# Patient Record
Sex: FEMALE | Race: WHITE
[De-identification: names, ages, dates, MRNs, and addresses within clinical notes are randomized per-mention and may not be internally consistent; named-entity substitution may affect disease eponyms.]

---

## 2020-04-30 ENCOUNTER — HOSPITAL ENCOUNTER (EMERGENCY)
Dept: HOSPITAL 47 - EC | Age: 50
Discharge: HOME | End: 2020-04-30
Payer: COMMERCIAL

## 2020-04-30 VITALS — HEART RATE: 82 BPM | DIASTOLIC BLOOD PRESSURE: 79 MMHG | SYSTOLIC BLOOD PRESSURE: 141 MMHG

## 2020-04-30 VITALS — TEMPERATURE: 98.5 F | RESPIRATION RATE: 18 BRPM

## 2020-04-30 DIAGNOSIS — Z79.899: ICD-10-CM

## 2020-04-30 DIAGNOSIS — F41.9: ICD-10-CM

## 2020-04-30 DIAGNOSIS — F17.200: ICD-10-CM
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
## 2020-04-30 DIAGNOSIS — S52.124A: Primary | ICD-10-CM

## 2020-04-30 DIAGNOSIS — F32.9: ICD-10-CM
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
## 2020-04-30 PROCEDURE — 99283 EMERGENCY DEPT VISIT LOW MDM: CPT

## 2020-04-30 PROCEDURE — 29105 APPLICATION LONG ARM SPLINT: CPT

## 2020-04-30 NOTE — ED
General Adult HPI





- General


Chief complaint: Extremity Injury, Upper


Stated complaint: Fall


Time Seen by Provider: 04/30/20 07:16


Source: patient, RN notes reviewed


Mode of arrival: ambulatory


Limitations: no limitations





- History of Present Illness


Initial comments: 





49-year-old female presents for mechanical fall yesterday.  Patient states she 

has been starting to go for frequent walks and tripped over the sidewalk where 

there was an uneven area.  Patient injured her elbow and has had pain radiating 

to her right wrist since that time.  She did not hit her head.  She is not on 

any blood thinners.Patient has no other complaints at this time including 

shortness of breath, chest pain, abdominal pain, nausea or vomiting, headache, 

or visual changes.





- Related Data


                                Home Medications











 Medication  Instructions  Recorded  Confirmed


 


clonazePAM [KlonoPIN] 1 mg PO BID 03/01/16 06/10/16


 


Ziprasidone HCl 160 mg PO DAILY 04/09/16 06/10/16


 


Vortioxetine Hydrobromide 20 mg PO 06/10/16 06/10/16





[Brintellix]   











                                    Allergies











Allergy/AdvReac Type Severity Reaction Status Date / Time


 


No Known Allergies Allergy   Verified 04/30/20 07:14














Review of Systems


ROS Statement: 


Those systems with pertinent positive or pertinent negative responses have been 

documented in the HPI.





ROS Other: All systems not noted in ROS Statement are negative.





Past Medical History


Past Medical History: No Reported History


Additional Past Medical History / Comment(s): anxiety  migraines


History of Any Multi-Drug Resistant Organisms: None Reported


Past Surgical History: No Surgical Hx Reported


Past Psychological History: Anxiety, Depression


Smoking Status: Current every day smoker


Past Alcohol Use History: Rare


Past Drug Use History: Marijuana





General Exam


Limitations: no limitations


General appearance: alert, in no apparent distress


Head exam: Present: atraumatic, normocephalic, normal inspection


Eye exam: Present: normal appearance, PERRL, EOMI.  Absent: scleral icterus, 

conjunctival injection, periorbital swelling


ENT exam: Present: normal exam, mucous membranes moist


Neck exam: Present: normal inspection.  Absent: tenderness, meningismus, 

lymphadenopathy


Respiratory exam: Present: normal lung sounds bilaterally


Extremities exam: Present: tenderness (Tenderness along the radial head as well 

as the olecranon.  No tenderness of the scaphoid or wrist or hand.), normal 

capillary refill (Capillary refill less than 2 seconds, radial pulse 2+.), other

 (Sensation intact in the right upper extremity.   strength 5 out of 5.).  

Absent: full ROM (Patient able to flex right elbow to 90 has pain with 

additional flexion or extension.  Full range of motion of the right hand didn't 

wrist.), pedal edema, joint swelling, calf tenderness





Course





                                   Vital Signs











  04/30/20





  07:12


 


Temperature 98.5 F


 


Pulse Rate 88


 


Respiratory 18





Rate 


 


Blood Pressure 148/62


 


O2 Sat by Pulse 98





Oximetry 














Procedures





- Orthopedic Splinting/Casting


  ** Injury #1


Side: right


Upper Extremity Injury Location: elbow


Upper Extremity Immobilizer: posterior splint


Additional Comments: 





Neurovascular status intact after splint applied





Medical Decision Making





- Medical Decision Making





X-ray of the elbow wrist and forearm were obtained which showed acute 

nondisplaced intra-articular transverse fracture of the radial head.  Additional

 acute avulsion type fracture of the proximal ulna with intra-articular 5 x 3 mm

 loose body noted.  She was placed in a long-arm splint and given a sling.  

Recommended range of motion exercises with the shoulder.  Recommend she follow 

up with orthopedics in the next day or 2.  She will return here if she has any 

worsening symptoms.





Disposition


Clinical Impression: 


 Elbow fracture, right





Disposition: HOME SELF-CARE


Condition: Good


Instructions (If sedation given, give patient instructions):  Elbow Fracture 

(ED)


Additional Instructions: 


Please take Tylenol for pain.  Please follow-up with orthopedics in one to 2 

days.  Wear sling and splint as directed.  Continue to do range of motion 

exercises of the shoulder while wearing the sling.  Return to the emergency 

department for any worsening symptoms.


Is patient prescribed a controlled substance at d/c from ED?: No


Referrals: 


Homer Pond DO [Primary Care Provider] - 1-2 days


Time of Disposition: 08:13

## 2020-04-30 NOTE — XR
EXAMINATION TYPE: XR elbow complete RT, XR wrist complete RT, XR forearm RT

 

DATE OF EXAM: 4/30/2020

 

CLINICAL HISTORY: Fall injury with pain.

 

TECHNIQUE:  Frontal, lateral and oblique images of the right wrist and elbow are obtained. Additional
 fourth scaphoid view right wrist. 2 views right forearm.

 

COMPARISON: None

 

FINDINGS:  There are abnormal fat pad signs. Acute nondisplaced transverse fracture through the radia
l head is present. There is also acute avulsion type fracture from the ulnar aspect of the proximal u
lna with 5 x 3 mm intra-articular loose body seen best on lateral view. The overlying soft tissue smita
ears unremarkable.

 

2 views of right forearm show no additional acute fracture or dislocation. Overlying soft tissue is u
nremarkable.

 

No additional acute fracture or dislocation is seen in the right wrist. Carpal joint spaces are maint
ained. Overlying soft tissue is unremarkable.

 

IMPRESSION:  There is acute nondisplaced intra-articular transverse fracture of the radial head. Elan
tional acute avulsion type fracture from the proximal ulna with intra-articular 5 x 3 mm loose body n
oted.

 

(Initial encounter closed type posttraumatic fracture)

## 2022-03-14 ENCOUNTER — HOSPITAL ENCOUNTER (OUTPATIENT)
Dept: HOSPITAL 47 - RADMAMWWP | Age: 52
Discharge: HOME | End: 2022-03-14
Attending: STUDENT IN AN ORGANIZED HEALTH CARE EDUCATION/TRAINING PROGRAM
Payer: COMMERCIAL

## 2022-03-14 DIAGNOSIS — Z12.31: Primary | ICD-10-CM

## 2022-03-14 PROCEDURE — 77063 BREAST TOMOSYNTHESIS BI: CPT

## 2022-03-14 PROCEDURE — 77067 SCR MAMMO BI INCL CAD: CPT

## 2022-03-15 NOTE — MM
Reason for exam: screening  (asymptomatic).

Last mammogram was performed 10 years and 5 months ago.



History:

Took hormonal contraceptives for 18 years beginning at age 17.



Physical Findings:

A clinical breast exam by your physician is recommended on an annual basis and 

results should be correlated with mammographic findings.



MG 3D Screening Mammo W/Cad

Bilateral CC and MLO view(s) were taken.

Prior study comparison: November 8, 2016, mammogram, performed at [facility].  August 17, 2015, mammogram, performed at [facility].  August 8, 2014, mammogram, performed at [facility].  

October 19, 2011, bilateral digital screening mammo w/CAD.  August 25, 2009, 

bilateral diagnostic digital mammog.

There are scattered fibroglandular densities.  There are benign appearing round 

calcifications bilaterally. There is no discrete abnormality.





ASSESSMENT: Benign, BI-RAD 2



RECOMMENDATION:

Routine screening mammogram of both breasts in 1 year.

## 2022-10-20 ENCOUNTER — HOSPITAL ENCOUNTER (INPATIENT)
Dept: HOSPITAL 47 - EC | Age: 52
LOS: 11 days | Discharge: HOME HEALTH | DRG: 329 | End: 2022-10-31
Attending: SURGERY | Admitting: SURGERY
Payer: COMMERCIAL

## 2022-10-20 VITALS — BODY MASS INDEX: 31.6 KG/M2

## 2022-10-20 DIAGNOSIS — C18.0: Primary | ICD-10-CM

## 2022-10-20 DIAGNOSIS — K35.32: ICD-10-CM

## 2022-10-20 DIAGNOSIS — Z88.8: ICD-10-CM

## 2022-10-20 DIAGNOSIS — E66.9: ICD-10-CM

## 2022-10-20 DIAGNOSIS — Z71.3: ICD-10-CM

## 2022-10-20 DIAGNOSIS — K56.7: ICD-10-CM

## 2022-10-20 DIAGNOSIS — K42.9: ICD-10-CM

## 2022-10-20 DIAGNOSIS — F17.200: ICD-10-CM

## 2022-10-20 DIAGNOSIS — Z71.6: ICD-10-CM

## 2022-10-20 DIAGNOSIS — F41.9: ICD-10-CM

## 2022-10-20 DIAGNOSIS — Z53.31: ICD-10-CM

## 2022-10-20 LAB
ALBUMIN SERPL-MCNC: 4.2 G/DL (ref 3.5–5)
ALP SERPL-CCNC: 94 U/L (ref 38–126)
ALT SERPL-CCNC: 16 U/L (ref 4–34)
ANION GAP SERPL CALC-SCNC: 13 MMOL/L
AST SERPL-CCNC: 17 U/L (ref 14–36)
BASOPHILS # BLD AUTO: 0 K/UL (ref 0–0.2)
BASOPHILS NFR BLD AUTO: 0 %
BUN SERPL-SCNC: 12 MG/DL (ref 7–17)
CALCIUM SPEC-MCNC: 9 MG/DL (ref 8.4–10.2)
CHLORIDE SERPL-SCNC: 101 MMOL/L (ref 98–107)
CO2 SERPL-SCNC: 24 MMOL/L (ref 22–30)
EOSINOPHIL # BLD AUTO: 0.1 K/UL (ref 0–0.7)
EOSINOPHIL NFR BLD AUTO: 1 %
ERYTHROCYTE [DISTWIDTH] IN BLOOD BY AUTOMATED COUNT: 4.42 M/UL (ref 3.8–5.4)
ERYTHROCYTE [DISTWIDTH] IN BLOOD: 13.8 % (ref 11.5–15.5)
GLUCOSE SERPL-MCNC: 93 MG/DL (ref 74–99)
HCT VFR BLD AUTO: 39.1 % (ref 34–46)
HGB BLD-MCNC: 13.5 GM/DL (ref 11.4–16)
LIPASE SERPL-CCNC: 34 U/L (ref 23–300)
LYMPHOCYTES # SPEC AUTO: 2.1 K/UL (ref 1–4.8)
LYMPHOCYTES NFR SPEC AUTO: 18 %
MCH RBC QN AUTO: 30.4 PG (ref 25–35)
MCHC RBC AUTO-ENTMCNC: 34.4 G/DL (ref 31–37)
MCV RBC AUTO: 88.4 FL (ref 80–100)
MONOCYTES # BLD AUTO: 0.7 K/UL (ref 0–1)
MONOCYTES NFR BLD AUTO: 6 %
NEUTROPHILS # BLD AUTO: 8.3 K/UL (ref 1.3–7.7)
NEUTROPHILS NFR BLD AUTO: 73 %
PH UR: 6 [PH] (ref 5–8)
PLATELET # BLD AUTO: 235 K/UL (ref 150–450)
POTASSIUM SERPL-SCNC: 4.4 MMOL/L (ref 3.5–5.1)
PROT SERPL-MCNC: 6.9 G/DL (ref 6.3–8.2)
SODIUM SERPL-SCNC: 138 MMOL/L (ref 137–145)
SP GR UR: 1.01 (ref 1–1.03)
UROBILINOGEN UR QL STRIP: <2 MG/DL (ref ?–2)
WBC # BLD AUTO: 11.4 K/UL (ref 3.8–10.6)

## 2022-10-20 PROCEDURE — 0WJG4ZZ INSPECTION OF PERITONEAL CAVITY, PERCUTANEOUS ENDOSCOPIC APPROACH: ICD-10-PCS

## 2022-10-20 PROCEDURE — 80053 COMPREHEN METABOLIC PANEL: CPT

## 2022-10-20 PROCEDURE — 83690 ASSAY OF LIPASE: CPT

## 2022-10-20 PROCEDURE — 96376 TX/PRO/DX INJ SAME DRUG ADON: CPT

## 2022-10-20 PROCEDURE — 0DTH0ZZ RESECTION OF CECUM, OPEN APPROACH: ICD-10-PCS

## 2022-10-20 PROCEDURE — 74177 CT ABD & PELVIS W/CONTRAST: CPT

## 2022-10-20 PROCEDURE — 81025 URINE PREGNANCY TEST: CPT

## 2022-10-20 PROCEDURE — 88309 TISSUE EXAM BY PATHOLOGIST: CPT

## 2022-10-20 PROCEDURE — 83735 ASSAY OF MAGNESIUM: CPT

## 2022-10-20 PROCEDURE — 36415 COLL VENOUS BLD VENIPUNCTURE: CPT

## 2022-10-20 PROCEDURE — 0WQF0ZZ REPAIR ABDOMINAL WALL, OPEN APPROACH: ICD-10-PCS

## 2022-10-20 PROCEDURE — 85025 COMPLETE CBC W/AUTO DIFF WBC: CPT

## 2022-10-20 PROCEDURE — 96375 TX/PRO/DX INJ NEW DRUG ADDON: CPT

## 2022-10-20 PROCEDURE — 87040 BLOOD CULTURE FOR BACTERIA: CPT

## 2022-10-20 PROCEDURE — 82570 ASSAY OF URINE CREATININE: CPT

## 2022-10-20 PROCEDURE — 87324 CLOSTRIDIUM AG IA: CPT

## 2022-10-20 PROCEDURE — 96374 THER/PROPH/DIAG INJ IV PUSH: CPT

## 2022-10-20 PROCEDURE — 99285 EMERGENCY DEPT VISIT HI MDM: CPT

## 2022-10-20 PROCEDURE — 83605 ASSAY OF LACTIC ACID: CPT

## 2022-10-20 PROCEDURE — 81003 URINALYSIS AUTO W/O SCOPE: CPT

## 2022-10-20 PROCEDURE — 0DTJ0ZZ RESECTION OF APPENDIX, OPEN APPROACH: ICD-10-PCS

## 2022-10-20 PROCEDURE — 64999 UNLISTED PX NERVOUS SYSTEM: CPT

## 2022-10-20 PROCEDURE — 96361 HYDRATE IV INFUSION ADD-ON: CPT

## 2022-10-20 PROCEDURE — 80048 BASIC METABOLIC PNL TOTAL CA: CPT

## 2022-10-20 RX ADMIN — ACETAMINOPHEN SCH MLS/HR: 10 INJECTION, SOLUTION INTRAVENOUS at 21:26

## 2022-10-20 RX ADMIN — CEFAZOLIN SCH: 330 INJECTION, POWDER, FOR SOLUTION INTRAMUSCULAR; INTRAVENOUS at 21:19

## 2022-10-20 RX ADMIN — DEXTROSE MONOHYDRATE, SODIUM CHLORIDE, AND POTASSIUM CHLORIDE SCH MLS/HR: 50; 4.5; 1.49 INJECTION, SOLUTION INTRAVENOUS at 21:26

## 2022-10-20 RX ADMIN — PIPERACILLIN AND TAZOBACTAM SCH MLS/HR: 3; .375 INJECTION, POWDER, FOR SOLUTION INTRAVENOUS at 23:23

## 2022-10-20 RX ADMIN — HYDROMORPHONE HYDROCHLORIDE PRN MG: 1 INJECTION, SOLUTION INTRAMUSCULAR; INTRAVENOUS; SUBCUTANEOUS at 13:01

## 2022-10-20 RX ADMIN — CEFAZOLIN SCH MLS/HR: 330 INJECTION, POWDER, FOR SOLUTION INTRAMUSCULAR; INTRAVENOUS at 12:56

## 2022-10-20 RX ADMIN — METRONIDAZOLE SCH MLS/HR: 500 INJECTION, SOLUTION INTRAVENOUS at 21:26

## 2022-10-20 RX ADMIN — HEPARIN SODIUM SCH UNIT: 5000 INJECTION INTRAVENOUS; SUBCUTANEOUS at 23:23

## 2022-10-20 NOTE — P.OP
Date of Procedure: 10/20/22


Procedure(s) Performed: 


PREOPERATIVE DIAGNOSIS: Acute appendicitis


POSTOPERATIVE DIAGNOSIS: Ruptured appendicitis with possible cecal tumor


PROCEDURE: Diagnostic laparoscopy converted to laparotomy with ileocecectomy and

open repair umbilical hernia


SURGEON: Paco


EBL: 50 mL


ANESTHESIA: General


COMPLICATIONS: None


OPERATIVE PROCEDURE: The patient was brought and placed on the operating table 

in the supine position.  The patient was placed under general anesthesia.  The 

abdomen was prepped and draped in the usual sterile fashion.  A small vertical 

infraumbilical incision was made.  The fascia was retracted anteriorly with 

Therese forceps.  The Veress needle was advanced into the peritoneal cavity.  The 

saline drop test was normal.  Insufflation took place to 15 mmHg.  A 5 mm trocar

was then placed.  An additional 5 mm suprapubic trocar was placed under direct 

visualization as well as a 12 mm left lower quadrant trocar under direct 

visualization.  The appendix was inspected.  It was distended and inflamed.  As 

I initially tried to grab the appendix and remove it inferiorly to inspect the 

base of the cecum a small defect in the thin wall of the appendix was 

identified.  A clearish purulence fluid was noted to drain from that area.  This

was aspirated immediately.  The base of the appendix at the cecum was difficult 

to visualize as the appendix was adherent to the cecum.  Palpation however of 

the base of the cecum suggested the presence of significant induration or mass. 

I could tell very quickly that we would not be able to dissect down to the base 

of the appendix and complete a resection of that area laparoscopically and that 

ileocecectomy would be required at this time.  The trochars were removed.  A 

midline incision was made using the scalpel.  Dissection through the 

subcutaneous fat and fascia took place using electrocautery.  The Bookwalter 

retractor was utilized.  The cecum and ascending colon were mobilized two thirds

of the way to the hepatic flexure.  Once I had adequate mobilization the small 

bowel was divided using a linear 75 stapler approximately 10 cm proximal to the 

ileocecal valve.  There was noted to be an abscess present at the junction 

between the appendix and the retroperitoneum.  As we were dissecting some 

purulent fluid was again encountered in that area.  The patient had a large 

amount of fat that was indurated posterior to the cecum that was involved in 

this phlegmon.  This was resected as it appeared adherent to the cecum.  The 

ureter was visualized and preserved.  We were able to bring the cecum medially. 

Once I had adequate space the ascending colon was divided using a linear 75 

stapler.  The mesentery wasn't divided using both 0 silk ties and the LigaSure 

device.  Following that a side-to-side anastomosis took place between the 

antimesenteric portions of the small bowel and the ascending colon.  The 

antimesenteric portion of the staple line was excised at both spots.  A linear 

75 stapler was fired along the antimesenteric border.  After the stapler was 

fired and the patient was noted to have solid stool in the ascending colon.  I 

had to remove some of this in order to complete the staple line.  A transverse 

TX 60 stapler was used at that time.  2 small areas of bleeding along the staple

line were controlled using figure-of-eight 3-0 GI silk.  A 3-0 GI silk crotch 

stitch was then placed.  The lumen between the small bowel and colon was about 

3-4 cm.  The area was then copiously irrigated.  I placed a drain posterior to t

he anastomosis in the right gutter.  This came out through the lateral left 

lower quadrant 12 mm trocar site.  The abdomen was then copiously irrigated as 

well.  No purulence was encountered.  Palpation of the right tube and ovary was 

normal with the exception of a small cyst on the fallopian tubes.  This measured

1 cm.  This appeared to be a simple cyst.  The liver by palpation seemed normal.

 No additional intra-abdominal abnormalities were encountered.  The midline 

fascia was then reapproximated using 2 separate double-stranded #1 PDS sutures. 

In doing so I noted 2 separate defects at the base of the umbilicus.  The fascia

was dissected and excised in that area so that we were reapproximated healthy 

fascia.  In doing so we repaired the umbilical hernia present.  The subcutaneous

tissues were irrigated.  No bleeding was seen.  These were reapproximated using 

3-0 Vicryl sutures.  The skin was reapproximated using staples.  A sterile 

dressings then applied.  


DISPOSITION: Stable to recovery room

## 2022-10-20 NOTE — CT
EXAMINATION TYPE: CT abdomen pelvis w con

CT DLP: 1334.4 mGycm, Automated exposure control for dose reduction was used.

 

DATE OF EXAM: 10/20/2022 12:00 PM

 

COMPARISON: None.  

CLINICAL INDICATION:Female, 52 years old with history of RLQ pain,fever; Right lower quadrant pain wi
th fever

 

TECHNIQUE:  Standard  CT of the abdomen and pelvis following the administration of 100 cc of Isovue 3
00 IV contrast material. Coronal and sagittal reformats were performed. 

 

FINDINGS: 

LOWER CHEST: Unremarkable

 

ABDOMEN

LIVER: Left hepatic lobe lobulated 3.0 cm cyst. Additional subcentimeter hypodensities within the tony
er which are too small to characterize.

GALLBLADDER AND BILE DUCTS: Unremarkable.

PANCREAS: Unremarkable.

SPLEEN: Unremarkable.

ADRENAL GLANDS: Unremarkable.

KIDNEYS AND URETERS: No evidence of hydronephrosis or renal calculus. The kidneys enhancement without
 suspicious focal lesion.

 

PELVIS

BLADDER: Unremarkable

REPRODUCTIVE: Lobulated appearance to the uterus likely representing fibroid changes.

 

ABDOMEN & PELVIS

STOMACH AND BOWEL: Stomach and duodenum are unremarkable. Dilated fluid-filled appendix measuring up 
to 1.7 cm with some wall thickening. There is abnormal phlegmonous change at its base extending into 
the retroperitoneum and along the right psoas muscle. No gas identified. No definitive organization. 
No visualized appendicolith. No evidence of bowel obstruction. 

PERITONEUM: No evidence of pneumoperitoneum or free fluid.

 

VASCULATURE: Mild atherosclerotic calcifications are present throughout the abdominal aorta and its b
ranches. No evidence of aortic aneurysm. Pelvic phleboliths.

MUSCULOSKELETAL: No acute osseous abnormalities. Mild degenerative disc disease most pronounced at L4
-L5.

LYMPH NODES: No gross evidence for lymphadenopathy.

SOFT TISSUE/ABDOMINAL WALL: Small fat filled hernia.

 

IMPRESSION:

Acute appendicitis with rupture at its base with surrounding phlegmonous change. No organized fluid c
ollection. 

 

Findings called to and discussed with Dr. Uli Liang at 12:15 PM on 10/20/2022.

## 2022-10-20 NOTE — ED
Abdominal Pain HPI





- General


Chief Complaint: Abdominal Pain


Stated Complaint: PCP sent for xray,ABD pain


Time Seen by Provider: 10/20/22 11:11


Source: patient, RN notes reviewed


Mode of arrival: ambulatory


Limitations: no limitations





- History of Present Illness


Initial Comments: 


52-year-old female presents emergency Department chief complaint of abdominal 

pain.  Patient is started yesterday morning has gradually worsened.  Patient 

does admit to fever.  Patient states it's in her right lower quadrant.  Patient 

does state that she saw her PCP today sent here for further evaluation patient 

states she thought she had a urinary tract infection or kidney infection but 

states her urinalysis was unremarkable.  Patient does not have any dysuria she 

states pain is slightly tender low back.  No chest pain or shortness breath 

she's had a prior ablation no other abdominal surgeries.








- Related Data


                                Home Medications











 Medication  Instructions  Recorded  Confirmed


 


clonazePAM [KlonoPIN] 1 mg PO BID PRN 03/01/16 10/20/22











                                    Allergies











Allergy/AdvReac Type Severity Reaction Status Date / Time


 


mirtazapine [From Remeron] AdvReac  "crazy Verified 10/20/22 12:05





   dreams"  














Review of Systems


ROS Statement: 


Those systems with pertinent positive or pertinent negative responses have been 

documented in the HPI.





ROS Other: All systems not noted in ROS Statement are negative.





Past Medical History


Past Medical History: No Reported History


Additional Past Medical History / Comment(s): anxiety  migraines


History of Any Multi-Drug Resistant Organisms: None Reported


Past Surgical History:  Section


Additional Past Surgical History / Comment(s): uterine ablation


Past Psychological History: Anxiety, Depression


Smoking Status: Current some day smoker


Past Alcohol Use History: None Reported, Rare


Past Drug Use History: None Reported, Marijuana





General Exam


Limitations: no limitations


General appearance: alert, in no apparent distress


Head exam: Present: atraumatic, normocephalic, normal inspection


Eye exam: Present: normal appearance, PERRL, EOMI.  Absent: scleral icterus, 

conjunctival injection, periorbital swelling


ENT exam: Present: normal exam, mucous membranes moist


Neck exam: Present: normal inspection, full ROM.  Absent: tenderness, 

meningismus, lymphadenopathy


Respiratory exam: Present: normal lung sounds bilaterally.  Absent: respiratory 

distress, wheezes, rales, rhonchi, stridor


Cardiovascular Exam: Present: normal rhythm, tachycardia, normal heart sounds.  

Absent: systolic murmur, diastolic murmur, rubs, gallop, clicks


GI/Abdominal exam: Present: soft, tenderness (Right lower quadrant), normal 

bowel sounds.  Absent: distended, guarding, rebound, rigid


Back exam: Absent: CVA tenderness (R), CVA tenderness (L)


Neurological exam: Present: alert


Skin exam: Present: warm, dry, intact, normal color.  Absent: rash





Course


                                   Vital Signs











  10/20/22





  11:06


 


Temperature 99.5 F


 


Pulse Rate 104 H


 


Respiratory 20





Rate 


 


Blood Pressure 107/74


 


O2 Sat by Pulse 100





Oximetry 














Medical Decision Making





- Medical Decision Making





52-year-old presented for abdominal pain CT shows evidence of acute appendicitis

with rupture, phlegmon formation.  I did discuss case with Dr. Antonio who 

reviewed the images patient was started on antibiotics and IV fluids will be 

kept nothing by mouth and pending surgery





- Lab Data


Result diagrams: 


                                 10/20/22 11:36





                                 10/20/22 11:36


                                   Lab Results











  10/20/22 10/20/22 10/20/22 Range/Units





  11:36 11:36 11:36 


 


WBC  11.4 H    (3.8-10.6)  k/uL


 


RBC  4.42    (3.80-5.40)  m/uL


 


Hgb  13.5    (11.4-16.0)  gm/dL


 


Hct  39.1    (34.0-46.0)  %


 


MCV  88.4    (80.0-100.0)  fL


 


MCH  30.4    (25.0-35.0)  pg


 


MCHC  34.4    (31.0-37.0)  g/dL


 


RDW  13.8    (11.5-15.5)  %


 


Plt Count  235    (150-450)  k/uL


 


MPV  7.9    


 


Neutrophils %  73    %


 


Lymphocytes %  18    %


 


Monocytes %  6    %


 


Eosinophils %  1    %


 


Basophils %  0    %


 


Neutrophils #  8.3 H    (1.3-7.7)  k/uL


 


Lymphocytes #  2.1    (1.0-4.8)  k/uL


 


Monocytes #  0.7    (0-1.0)  k/uL


 


Eosinophils #  0.1    (0-0.7)  k/uL


 


Basophils #  0.0    (0-0.2)  k/uL


 


Sodium   138   (137-145)  mmol/L


 


Potassium   4.4   (3.5-5.1)  mmol/L


 


Chloride   101   ()  mmol/L


 


Carbon Dioxide   24   (22-30)  mmol/L


 


Anion Gap   13   mmol/L


 


BUN   12   (7-17)  mg/dL


 


Creatinine   0.62   (0.52-1.04)  mg/dL


 


Est GFR (CKD-EPI)AfAm   >90   (>60 ml/min/1.73 sqM)  


 


Est GFR (CKD-EPI)NonAf   >90   (>60 ml/min/1.73 sqM)  


 


Glucose   93   (74-99)  mg/dL


 


Plasma Lactic Acid Ventura    1.3  (0.7-2.0)  mmol/L


 


Calcium   9.0   (8.4-10.2)  mg/dL


 


Total Bilirubin   0.8   (0.2-1.3)  mg/dL


 


AST   17   (14-36)  U/L


 


ALT   16   (4-34)  U/L


 


Alkaline Phosphatase   94   ()  U/L


 


Total Protein   6.9   (6.3-8.2)  g/dL


 


Albumin   4.2   (3.5-5.0)  g/dL


 


Lipase   34   ()  U/L


 


Urine Color     


 


Urine Appearance     (Clear)  


 


Urine pH     (5.0-8.0)  


 


Ur Specific Gravity     (1.001-1.035)  


 


Urine Protein     (Negative)  


 


Urine Glucose (UA)     (Negative)  


 


Urine Ketones     (Negative)  


 


Urine Blood     (Negative)  


 


Urine Nitrite     (Negative)  


 


Urine Bilirubin     (Negative)  


 


Urine Urobilinogen     (<2.0)  mg/dL


 


Ur Leukocyte Esterase     (Negative)  














  10/20/22 Range/Units





  11:38 


 


WBC   (3.8-10.6)  k/uL


 


RBC   (3.80-5.40)  m/uL


 


Hgb   (11.4-16.0)  gm/dL


 


Hct   (34.0-46.0)  %


 


MCV   (80.0-100.0)  fL


 


MCH   (25.0-35.0)  pg


 


MCHC   (31.0-37.0)  g/dL


 


RDW   (11.5-15.5)  %


 


Plt Count   (150-450)  k/uL


 


MPV   


 


Neutrophils %   %


 


Lymphocytes %   %


 


Monocytes %   %


 


Eosinophils %   %


 


Basophils %   %


 


Neutrophils #   (1.3-7.7)  k/uL


 


Lymphocytes #   (1.0-4.8)  k/uL


 


Monocytes #   (0-1.0)  k/uL


 


Eosinophils #   (0-0.7)  k/uL


 


Basophils #   (0-0.2)  k/uL


 


Sodium   (137-145)  mmol/L


 


Potassium   (3.5-5.1)  mmol/L


 


Chloride   ()  mmol/L


 


Carbon Dioxide   (22-30)  mmol/L


 


Anion Gap   mmol/L


 


BUN   (7-17)  mg/dL


 


Creatinine   (0.52-1.04)  mg/dL


 


Est GFR (CKD-EPI)AfAm   (>60 ml/min/1.73 sqM)  


 


Est GFR (CKD-EPI)NonAf   (>60 ml/min/1.73 sqM)  


 


Glucose   (74-99)  mg/dL


 


Plasma Lactic Acid Ventura   (0.7-2.0)  mmol/L


 


Calcium   (8.4-10.2)  mg/dL


 


Total Bilirubin   (0.2-1.3)  mg/dL


 


AST   (14-36)  U/L


 


ALT   (4-34)  U/L


 


Alkaline Phosphatase   ()  U/L


 


Total Protein   (6.3-8.2)  g/dL


 


Albumin   (3.5-5.0)  g/dL


 


Lipase   ()  U/L


 


Urine Color  Light Yellow  


 


Urine Appearance  Clear  (Clear)  


 


Urine pH  6.0  (5.0-8.0)  


 


Ur Specific Gravity  1.007  (1.001-1.035)  


 


Urine Protein  Negative  (Negative)  


 


Urine Glucose (UA)  Negative  (Negative)  


 


Urine Ketones  Negative  (Negative)  


 


Urine Blood  Negative  (Negative)  


 


Urine Nitrite  Negative  (Negative)  


 


Urine Bilirubin  Negative  (Negative)  


 


Urine Urobilinogen  <2.0  (<2.0)  mg/dL


 


Ur Leukocyte Esterase  Negative  (Negative)  














Disposition


Clinical Impression: 


 Acute appendicitis with rupture





Disposition: ADMITTED AS IP TO THIS HOSP


Condition: Fair


Referrals: 


Homer Pond DO [Primary Care Provider] - 1-2 days


Time of Disposition: 12:19

## 2022-10-20 NOTE — P.GSHP
History of Present Illness


H&P Date: 10/20/22





CHIEF COMPLAINT: Right lower quadrant abdominal pain





HISTORY OF PRESENT ILLNESS: This is a 52-year-old female who presented to the ER

with complaints of right lower quadrant abdominal pain that started yesterday 

morning.  Patient reports that she woke up with right lower quadrant abdominal 

pain that he continued to increase throughout the day.  She reports that the 

patient did not radiate.  She has been nauseous.  She's had a fever as high as 

101.4 at home.  Patient had a computed tomography scan that showed acute 

appendicitis with rupture.  She had leukocytosis, mild tachycardia on admission.

 Past surgical history includes  and uterine ablation.  She denies any 

cardiac history.





PAST MEDICAL HISTORY: 


Anxiety and migraines





PAST SURGICAL HISTORY: 


See list.





MEDICATIONS: 


See list.





ALLERGIES: 


See list.





SOCIAL HISTORY: No illicit drug use.  Occasional smoking and alcohol use





REVIEW OF SYSTEMS: 


CONSTITUTIONAL: Denies fever or chills.


HEENT: Denies blurred vision, vision changes, or eye pain. Denies hemoptysis 


CARDIOVASCULAR: Denies chest pain or pressure.


RESPIRATORY: No shortness of breath. 


GASTROINTESTINAL: See HPI for pertinent findings


HEMATOLOGIC: Denies bleeding disorders.


GENITOURINARY:  Denies any blood in urine or increased urinary frequency.  


SKIN: Denies pruitis. Denies rash.





PHYSICAL EXAM: 


VITAL SIGNS: Reviewed


GENERAL: Well-developed in no acute distress. 


HEENT:  No sclera icterus. Extraocular movements grossly intact.  Moist buccal 

mucosa. Head is atraumatic, normocephalic. No nasal drainage.


ABDOMEN:  Soft.  Obese.  Nondistended.  Right lower quadrant tenderness with 

palpation


NEUROLOGIC:  Alert and oriented.  Cranial nerves II through XII grossly intact.





LABORATORY DATA:


WBC is 11.4 Hgb 13.5 platelets 235


Sodium is 138 potassium 4.4 creatinine 0.62


Lactic acid 1.3


LFTs are normal


Lipase 34


Urinalysis negative


Urine hCG not detected





IMAGING:


Computed tomography scan abdomen and pelvis showing acute appendicitis with 

rupture at its base with surrounding phlegmonous change.  No organized fluid 

collection.





ASSESSMENT: 


1.  Acute ruptured appendicitis





PLAN: 


-Patient scheduled for laparoscopic, possible open appendectomy today with Dr. Antonio


-Keep patient nothing by mouth


-Continue IV fluids


-Continue IV antibiotics


-Continue pain medication as needed


-Continue antiemetics





Physician Assistant note has been reviewed by physician. Signing provider agrees

with the documented findings, assessment, and plan of care.  Patient presents 

with acute onset abdominal pain right lower quadrant yesterday.  CAT scan shows 

impressive inflammatory changes around the appendix.  On exam patient with 

significant right lower quadrant and right upper quadrant tenderness.  We'll 

proceed with laparoscopic, possible open appendectomy at this time.  Risks of 

bleeding, infection, abscess, possible need for bowel resection, possible need f

or conversion, bladder bowel and ureteral injury, hernia all reviewed.  She 

understands and wishes to proceed.





Past Medical History


Past Medical History: No Reported History


Additional Past Medical History / Comment(s): anxiety  migraines


History of Any Multi-Drug Resistant Organisms: None Reported


Past Surgical History:  Section


Additional Past Surgical History / Comment(s): uterine ablation


Past Psychological History: Anxiety, Depression


Smoking Status: Current some day smoker


Past Alcohol Use History: None Reported, Rare


Past Drug Use History: None Reported, Marijuana





Medications and Allergies


                                Home Medications











 Medication  Instructions  Recorded  Confirmed  Type


 


clonazePAM [KlonoPIN] 1 mg PO BID PRN 03/01/16 10/20/22 History








                                    Allergies











Allergy/AdvReac Type Severity Reaction Status Date / Time


 


mirtazapine [From Remeron] AdvReac  "crazy Verified 10/20/22 15:40





   dreams"  














Surgical - Exam


                                   Vital Signs











Temp Pulse Resp BP Pulse Ox


 


 99.5 F   104 H  20   107/74   100 


 


 10/20/22 11:06  10/20/22 11:06  10/20/22 11:06  10/20/22 11:06  10/20/22 11:06














Results





- Labs





                                 10/20/22 11:36





                                 10/20/22 11:36


                  Abnormal Lab Results - Last 24 Hours (Table)











  10/20/22 Range/Units





  11:36 


 


WBC  11.4 H  (3.8-10.6)  k/uL


 


Neutrophils #  8.3 H  (1.3-7.7)  k/uL








                                 Diabetes panel











  10/20/22 Range/Units





  11:36 


 


Sodium  138  (137-145)  mmol/L


 


Potassium  4.4  (3.5-5.1)  mmol/L


 


Chloride  101  ()  mmol/L


 


Carbon Dioxide  24  (22-30)  mmol/L


 


BUN  12  (7-17)  mg/dL


 


Creatinine  0.62  (0.52-1.04)  mg/dL


 


Glucose  93  (74-99)  mg/dL


 


Calcium  9.0  (8.4-10.2)  mg/dL


 


AST  17  (14-36)  U/L


 


ALT  16  (4-34)  U/L


 


Alkaline Phosphatase  94  ()  U/L


 


Total Protein  6.9  (6.3-8.2)  g/dL


 


Albumin  4.2  (3.5-5.0)  g/dL








                                  Calcium panel











  10/20/22 Range/Units





  11:36 


 


Calcium  9.0  (8.4-10.2)  mg/dL


 


Albumin  4.2  (3.5-5.0)  g/dL








                                 Pituitary panel











  10/20/22 Range/Units





  11:36 


 


Sodium  138  (137-145)  mmol/L


 


Potassium  4.4  (3.5-5.1)  mmol/L


 


Chloride  101  ()  mmol/L


 


Carbon Dioxide  24  (22-30)  mmol/L


 


BUN  12  (7-17)  mg/dL


 


Creatinine  0.62  (0.52-1.04)  mg/dL


 


Glucose  93  (74-99)  mg/dL


 


Calcium  9.0  (8.4-10.2)  mg/dL








                                  Adrenal panel











  10/20/22 Range/Units





  11:36 


 


Sodium  138  (137-145)  mmol/L


 


Potassium  4.4  (3.5-5.1)  mmol/L


 


Chloride  101  ()  mmol/L


 


Carbon Dioxide  24  (22-30)  mmol/L


 


BUN  12  (7-17)  mg/dL


 


Creatinine  0.62  (0.52-1.04)  mg/dL


 


Glucose  93  (74-99)  mg/dL


 


Calcium  9.0  (8.4-10.2)  mg/dL


 


Total Bilirubin  0.8  (0.2-1.3)  mg/dL


 


AST  17  (14-36)  U/L


 


ALT  16  (4-34)  U/L


 


Alkaline Phosphatase  94  ()  U/L


 


Total Protein  6.9  (6.3-8.2)  g/dL


 


Albumin  4.2  (3.5-5.0)  g/dL

## 2022-10-21 LAB
ANION GAP SERPL CALC-SCNC: 11.4 MMOL/L (ref 10–18)
BASOPHILS # BLD AUTO: 0.01 X 10*3/UL (ref 0–0.1)
BASOPHILS NFR BLD AUTO: 0.1 %
BUN SERPL-SCNC: 7.3 MG/DL (ref 9–27)
BUN/CREAT SERPL: 10.43 RATIO (ref 12–20)
CALCIUM SPEC-MCNC: 8.4 MG/DL (ref 8.7–10.3)
CHLORIDE SERPL-SCNC: 104 MMOL/L (ref 96–109)
CO2 SERPL-SCNC: 20.6 MMOL/L (ref 20–27.5)
EOSINOPHIL # BLD AUTO: 0 X 10*3/UL (ref 0.04–0.35)
EOSINOPHIL NFR BLD AUTO: 0 %
ERYTHROCYTE [DISTWIDTH] IN BLOOD BY AUTOMATED COUNT: 4 X 10*6/UL (ref 4.1–5.2)
ERYTHROCYTE [DISTWIDTH] IN BLOOD: 13.9 % (ref 11.5–14.5)
GLUCOSE SERPL-MCNC: 247 MG/DL (ref 70–110)
HCT VFR BLD AUTO: 36.7 % (ref 37.2–46.3)
HGB BLD-MCNC: 11.9 G/DL (ref 12–15)
IMM GRANULOCYTES BLD QL AUTO: 0.3 %
LYMPHOCYTES # SPEC AUTO: 0.47 X 10*3/UL (ref 0.9–5)
LYMPHOCYTES NFR SPEC AUTO: 4.5 %
MCH RBC QN AUTO: 29.8 PG (ref 27–32)
MCHC RBC AUTO-ENTMCNC: 32.4 G/DL (ref 32–37)
MCV RBC AUTO: 91.8 FL (ref 80–97)
MONOCYTES # BLD AUTO: 0.32 X 10*3/UL (ref 0.2–1)
MONOCYTES NFR BLD AUTO: 3.1 %
NEUTROPHILS # BLD AUTO: 9.5 X 10*3/UL (ref 1.8–7.7)
NEUTROPHILS NFR BLD AUTO: 92 %
NRBC BLD AUTO-RTO: 0 /100 WBCS (ref 0–0)
PLATELET # BLD AUTO: 251 X 10*3/UL (ref 140–440)
POTASSIUM SERPL-SCNC: 4.8 MMOL/L (ref 3.5–5.5)
SODIUM SERPL-SCNC: 136 MMOL/L (ref 135–145)
WBC # BLD AUTO: 10.33 X 10*3/UL (ref 4.5–10)

## 2022-10-21 RX ADMIN — PANTOPRAZOLE SODIUM SCH MG: 40 INJECTION, POWDER, FOR SOLUTION INTRAVENOUS at 07:23

## 2022-10-21 RX ADMIN — ACETAMINOPHEN SCH MLS/HR: 10 INJECTION, SOLUTION INTRAVENOUS at 02:21

## 2022-10-21 RX ADMIN — METRONIDAZOLE SCH MLS/HR: 500 INJECTION, SOLUTION INTRAVENOUS at 19:58

## 2022-10-21 RX ADMIN — HYDROMORPHONE HYDROCHLORIDE PRN MG: 1 INJECTION, SOLUTION INTRAMUSCULAR; INTRAVENOUS; SUBCUTANEOUS at 09:23

## 2022-10-21 RX ADMIN — HYDROMORPHONE HYDROCHLORIDE PRN MG: 1 INJECTION, SOLUTION INTRAMUSCULAR; INTRAVENOUS; SUBCUTANEOUS at 00:25

## 2022-10-21 RX ADMIN — ACETAMINOPHEN SCH MLS/HR: 10 INJECTION, SOLUTION INTRAVENOUS at 13:41

## 2022-10-21 RX ADMIN — METRONIDAZOLE SCH MLS/HR: 500 INJECTION, SOLUTION INTRAVENOUS at 12:30

## 2022-10-21 RX ADMIN — DEXTROSE MONOHYDRATE, SODIUM CHLORIDE, AND POTASSIUM CHLORIDE SCH MLS/HR: 50; 4.5; 1.49 INJECTION, SOLUTION INTRAVENOUS at 16:02

## 2022-10-21 RX ADMIN — CEFAZOLIN SCH: 330 INJECTION, POWDER, FOR SOLUTION INTRAMUSCULAR; INTRAVENOUS at 02:18

## 2022-10-21 RX ADMIN — KETOROLAC TROMETHAMINE SCH MG: 15 INJECTION, SOLUTION INTRAMUSCULAR; INTRAVENOUS at 10:38

## 2022-10-21 RX ADMIN — PIPERACILLIN AND TAZOBACTAM SCH MLS/HR: 3; .375 INJECTION, POWDER, FOR SOLUTION INTRAVENOUS at 15:55

## 2022-10-21 RX ADMIN — HEPARIN SODIUM SCH UNIT: 5000 INJECTION INTRAVENOUS; SUBCUTANEOUS at 07:24

## 2022-10-21 RX ADMIN — KETOROLAC TROMETHAMINE SCH MG: 15 INJECTION, SOLUTION INTRAMUSCULAR; INTRAVENOUS at 15:55

## 2022-10-21 RX ADMIN — METRONIDAZOLE SCH MLS/HR: 500 INJECTION, SOLUTION INTRAVENOUS at 04:13

## 2022-10-21 RX ADMIN — PIPERACILLIN AND TAZOBACTAM SCH MLS/HR: 3; .375 INJECTION, POWDER, FOR SOLUTION INTRAVENOUS at 08:25

## 2022-10-21 RX ADMIN — ACETAMINOPHEN SCH MLS/HR: 10 INJECTION, SOLUTION INTRAVENOUS at 07:24

## 2022-10-21 RX ADMIN — HEPARIN SODIUM SCH UNIT: 5000 INJECTION INTRAVENOUS; SUBCUTANEOUS at 15:56

## 2022-10-21 RX ADMIN — KETOROLAC TROMETHAMINE SCH MG: 15 INJECTION, SOLUTION INTRAMUSCULAR; INTRAVENOUS at 21:27

## 2022-10-21 RX ADMIN — CEFAZOLIN SCH: 330 INJECTION, POWDER, FOR SOLUTION INTRAMUSCULAR; INTRAVENOUS at 12:14

## 2022-10-21 RX ADMIN — HYDROMORPHONE HYDROCHLORIDE PRN MG: 1 INJECTION, SOLUTION INTRAMUSCULAR; INTRAVENOUS; SUBCUTANEOUS at 04:14

## 2022-10-21 RX ADMIN — DEXTROSE MONOHYDRATE, SODIUM CHLORIDE, AND POTASSIUM CHLORIDE SCH MLS/HR: 50; 4.5; 1.49 INJECTION, SOLUTION INTRAVENOUS at 04:15

## 2022-10-21 RX ADMIN — DEXTROSE MONOHYDRATE, SODIUM CHLORIDE, AND POTASSIUM CHLORIDE SCH MLS/HR: 50; 4.5; 1.49 INJECTION, SOLUTION INTRAVENOUS at 12:30

## 2022-10-21 RX ADMIN — HYDROMORPHONE HYDROCHLORIDE PRN MG: 1 INJECTION, SOLUTION INTRAMUSCULAR; INTRAVENOUS; SUBCUTANEOUS at 13:40

## 2022-10-21 NOTE — P.CONS
History of Present Illness





- Reason for Consult


Consult date: 10/21/22


Requesting physician: Raymon Antonio





- History of Present Illness





his is a 52-year-old female who I sent to the emergency room from my office with

complaints of right lower quadrant abdominal pain that started yesterday 

morning.  Patient reports that she woke up with right lower quadrant abdominal 

pain that he continued to increase throughout the day.  She reports that the 

patient did not radiate.  She has been nauseous.  She's had a fever as high as 

101.4 at home.  Patient had a computed tomography scan that showed acute 

appendicitis with rupture.  She had leukocytosis, mild tachycardia on admission.

 Past surgical history includes  and uterine ablation.  She denies any 

cardiac history.  She was seen by Dr. Noble in consultation and taken to 

surgery for appendectomy








Review of Systems





GENERAL: Patient with fever. Denies chills.


EYES: Denies blurred vision. Denies vision changes. Denies eye pain.


EARS, NOSE, MOUTH, & THROAT: Denies headache. Denies sore throat. Denies ear 

pain.


RESPIRATORY: Denies cough. Denies shortness of breath. Denies sputum production.

Denies hemoptysis. 


CARDIOVASCULAR: Denies chest pain or pressure. Denies palpitations. Denies 

arrhythmias.


GASTROINTESTINAL: Admits to abdominal pain. Denies diarrhea. Denies 

constipation.  Positive for nausea. Denies vomiting. Denies heartburn. Denies 

blood in the stool. 


GENITOURINARY: Denies urinary frequency. Denies burning. Denies dysuria. Denies 

cloudy urine.  Denies blood in the urine.


MUSCULOSKELETAL:  Denies myalgias. Denies joint swelling. Denies decreased range

of motion beyond patients baseline.


INTEGUMENTARY: Denies pruitis. Denies rash.


PSYCHIATRIC: Denies suicidal or homicial ideations.


ENDOCRINE: Denies weight change. Denies polydipsia. Denies polyuria.


HEMATOLOGIC: Denies bleeding disorders. 





Past Medical History


Past Medical History: No Reported History


Additional Past Medical History / Comment(s): anxiety  migraines


History of Any Multi-Drug Resistant Organisms: None Reported


Past Surgical History:  Section


Additional Past Surgical History / Comment(s): uterine ablation


Smoking Status: Current some day smoker





Medications and Allergies


                                Home Medications











 Medication  Instructions  Recorded  Confirmed  Type


 


clonazePAM [KlonoPIN] 1 mg PO BID PRN 03/01/16 10/20/22 History








                                    Allergies











Allergy/AdvReac Type Severity Reaction Status Date / Time


 


mirtazapine [From Remeron] AdvReac  "crazy Verified 10/20/22 15:40





   dreams"  














Physical Exam


Osteopathic Statement: *.  No significant issues noted on an osteopathic 

structural exam other than those noted in the History and Physical/Consult.


Vitals: 


                                   Vital Signs











  Temp Pulse Resp BP Pulse Ox


 


 10/21/22 19:08  98.7 F  59 L  15  100/67  95


 


 10/21/22 12:36  97.7 F  66  18  98/60  95


 


 10/21/22 05:16  98.1 F  81  16  100/66  98


 


 10/21/22 01:05  97.8 F  73  18  109/68  95


 


 10/21/22 01:04   84   110/73  96


 


 10/20/22 23:00  98.0 F  100  18  100/67  94 L








                                Intake and Output











 10/21/22 10/21/22 10/21/22





 06:59 14:59 22:59


 


Intake Total 590  3825


 


Output Total  1400 2765


 


Balance 590 -1400 1060


 


Intake:   


 


  Intake, IV Titration   1675





  Amount   


 


    ACETAMINOPHEN IV (For NPO   100





    ) 1,000 mg In Empty Bag 1   





    bag @ 400 mls/hr IVPB   





    Q6H PABLO Rx#:[id number]   


 


    D5-0.45% NaCl with KCl   1375





    20Meq/l 1,000 ml @ 100   





    mls/hr IV .Q10H PABLO Rx#:   





    [id number]   


 


    metroNIDAZOLE-NS    200





    mg In Saline 1 100ml.bag   





    @ 100 mls/hr IVPB Q8H PABLO   





    Rx#:[id number]   


 


  Oral 590  2150


 


Output:   


 


  Drainage   40


 


    Left Abdomen   40


 


  Urine  1400 2725


 


Other:   


 


  Voiding Method  Indwelling Catheter 














GENERAL: This is a 52-year-old   in distress at the time of 

examination. Pleasant and cooperative.


HEENT: Head is atraumatic, normocephalic. Pupils are equal, round, and reactive 

to light. Sclerae anicteric. Conjunctivae are clear. Mucus membranes of the 

mouth are moist. Neck is supple. 


RESPIRATORY: Clear to auscultation. No wheezes, rales, or rhonchi.  


CARDIOVASCULAR: Regular rate and rhythm. S1 and S2 noted.  No systolic or 

diastolic murmur auscultated.  No JVD noted. No S3 or S4 noted.


GASTROINTESTINAL: No distention noted.  Abdomen soft and round.  Normal active 

bowel sounds auscultated x 4 quadrants.  No pain or tenderness noted upon 

palpation.


INTEGUMENTARY: No cyanosis. No jaundice. No rashes noted. No cellulitis noted.


EXTREMITIES: 2+ peripheral pulses. No evidence of peripheral edema. No calf 

tenderness noted.


NEUROLOGIC:  Cranial nerves II-XII intact.


PSYCHIATRIC: Awake, alert, and oriented X 3. Appropriate affect. Intact 

judgement and insight.





Results


CBC & Chem 7: 


                                 10/21/22 06:44





                                 10/21/22 06:44


Labs: 


                  Abnormal Lab Results - Last 24 Hours (Table)











  10/21/22 10/21/22 Range/Units





  06:44 06:44 


 


WBC   10.33 H  (4.50-10.00)  X 10*3/uL


 


RBC   4.00 L  (4.10-5.20)  X 10*6/uL


 


Hgb   11.9 L  (12.0-15.0)  g/dL


 


Hct   36.7 L  (37.2-46.3)  %


 


Neutrophils #   9.50 H  (1.80-7.70)  X 10*3/uL


 


Lymphocytes #   0.47 L  (0.90-5.00)  X 10*3/uL


 


Eosinophils #   0 L  (0.04-0.35)  X 10*3/uL


 


BUN  7.3 L   (9.0-27.0)  mg/dL


 


BUN/Creatinine Ratio  10.43 L   (12.00-20.00)  Ratio


 


Glucose  247 H   ()  mg/dL


 


Calcium  8.4 L   (8.7-10.3)  mg/dL








                      Microbiology - Last 24 Hours (Table)











 10/20/22 12:55 Blood Culture - Preliminary





 Blood    No Growth after 24 hours


 


 10/20/22 12:40 Blood Culture - Preliminary





 Blood    No Growth after 24 hours














Assessment and Plan


(1) Acute appendicitis with rupture


Current Visit: Yes   Status: Acute   Code(s): K35.32 - AC APPENDICITIS W PERF, 

LOC PERITONITIS, & GANGR, W/O ABSCS   SNOMED Code(s): 04989586


   





(2) Anxiety


Current Visit: No   Status: Acute   Code(s): F41.9 - ANXIETY DISORDER, 

UNSPECIFIED   SNOMED Code(s): 54860550


   


Plan: 





Continue postoperative care patient is recovering nicely continue postoperative 

antibiotics as ordered patient most likely be in the hospital another 24-36 

hours thank you for the consultation and allowing me to participate in this 

patient's care.

## 2022-10-21 NOTE — P.PN
Subjective


Progress Note Date: 10/21/22





CHIEF COMPLAINT: Ruptured appendicitis with possible cecal tumor





HISTORY OF PRESENT ILLNESS: Patient is postop day #1 status post diagnostic 

laparoscopy converted to laparotomy with ileocecectomy and open repair of 

umbilical hernia.  Patient complaining of pain rating it about a 6 out of 10.  

She denies any nausea or vomiting.  Denies any flatus.  Afebrile.  WBC 11.4 down

to 10.33 hemoglobin 11.9 platelets 251.  MARIA M drain with 45 mL serosanguineous 

output





PHYSICAL EXAM: 


VITAL SIGNS: Reviewed.


GENERAL: Well-developed in no acute distress. 


HEENT:  No sclera icterus. Extraocular movements grossly intact.  Moist buccal 

mucosa. Head is atraumatic, normocephalic. 


ABDOMEN:  Soft.  Nondistended.  Tenderness at incision site.  Incisional 

dressing clean dry and intact.  MARIA M drain with serosanguineous output


NEUROLOGIC: Alert and oriented. Cranial nerves II through XII grossly intact.





ASSESSMENT: 


1.  Ruptured appendicitis with possible cecal tumor status post diagnostic 

laparoscopy converted to laparotomy with ileocecectomy and open repair of 

umbilical hernia








PLAN: 


-Toradol added for pain control


-Continue pain management


-Continue clear liquid diet


-Continue antibiotics


-Encourage patient to ambulate


-Incentive spirometer ordered


-GI prophylaxis Protonix and DVT prophylaxis subcu heparin





Physician Assistant note has been reviewed by physician. Signing provider agrees

with the documented findings, assessment, and plan of care. 





Objective





- Vital Signs


Vital signs: 


                                   Vital Signs











Temp  97.7 F   10/21/22 12:36


 


Pulse  66   10/21/22 12:36


 


Resp  18   10/21/22 12:36


 


BP  98/60   10/21/22 12:36


 


Pulse Ox  95   10/21/22 12:36


 


FiO2      








                                 Intake & Output











 10/20/22 10/21/22 10/21/22





 18:59 06:59 18:59


 


Intake Total 850 810 


 


Output Total  195 1400


 


Balance 850 615 -1400


 


Weight 94.347 kg 94.347 kg 


 


Intake:   


 


   100 


 


  Oral  710 


 


Output:   


 


  Drainage  45 


 


    Left Abdomen  45 


 


  Urine  100 1400


 


  Estimated Blood Loss  50 


 


Other:   


 


  Voiding Method  Indwelling Catheter Indwelling Catheter














- Labs


CBC & Chem 7: 


                                 10/21/22 06:44





                                 10/21/22 06:44


Labs: 


                  Abnormal Lab Results - Last 24 Hours (Table)











  10/21/22 10/21/22 Range/Units





  06:44 06:44 


 


WBC   10.33 H  (4.50-10.00)  X 10*3/uL


 


RBC   4.00 L  (4.10-5.20)  X 10*6/uL


 


Hgb   11.9 L  (12.0-15.0)  g/dL


 


Hct   36.7 L  (37.2-46.3)  %


 


Neutrophils #   9.50 H  (1.80-7.70)  X 10*3/uL


 


Lymphocytes #   0.47 L  (0.90-5.00)  X 10*3/uL


 


Eosinophils #   0 L  (0.04-0.35)  X 10*3/uL


 


BUN  7.3 L   (9.0-27.0)  mg/dL


 


BUN/Creatinine Ratio  10.43 L   (12.00-20.00)  Ratio


 


Glucose  247 H   ()  mg/dL


 


Calcium  8.4 L   (8.7-10.3)  mg/dL

## 2022-10-21 NOTE — P.ANPRN
Procedure Note - Anesthesia





- Nerve Block Performed


  ** Bilateral Erector Spinae Single


Time Out Performed: Yes


Date of Procedure: 10/20/22


Location of Patient: PreOp


Indication: Acute Post-Operative Pain, Requested by Surgeon


Sedation Type: Sedate with meaningful contact maintained


Preparation: Sterile Prep


Position: Sitting


Needle Types: Pajunk


Needle Gauge: 21


Ultrasound used to visualize needle placement: Yes


Ultrasound used to observe medication spread: Yes


Blood Aspirated: No


Pain Paresthesia on Injection Noted: No


Resistance on Injection: Normal


Image Stored and Saved: Yes


Events: Uneventful and Well Tolerated (Ropivacaine 0.515 mL plus normal saline 

10 mL was dexamethasone 4 mg given bilaterally at L1)

## 2022-10-22 LAB
ANION GAP SERPL CALC-SCNC: 8.5 MMOL/L (ref 10–18)
BASOPHILS # BLD AUTO: 0.01 X 10*3/UL (ref 0–0.1)
BASOPHILS NFR BLD AUTO: 0.1 %
BUN SERPL-SCNC: 10.4 MG/DL (ref 9–27)
BUN/CREAT SERPL: 15.85 RATIO (ref 12–20)
CALCIUM SPEC-MCNC: 8.4 MG/DL (ref 8.7–10.3)
CHLORIDE SERPL-SCNC: 106 MMOL/L (ref 96–109)
CO2 SERPL-SCNC: 21.5 MMOL/L (ref 20–27.5)
EOSINOPHIL # BLD AUTO: 0 X 10*3/UL (ref 0.04–0.35)
EOSINOPHIL NFR BLD AUTO: 0 %
ERYTHROCYTE [DISTWIDTH] IN BLOOD BY AUTOMATED COUNT: 3.25 X 10*6/UL (ref 4.1–5.2)
ERYTHROCYTE [DISTWIDTH] IN BLOOD: 14.2 % (ref 11.5–14.5)
GLUCOSE SERPL-MCNC: 109 MG/DL (ref 70–110)
HCT VFR BLD AUTO: 29.8 % (ref 37.2–46.3)
HGB BLD-MCNC: 9.7 G/DL (ref 12–15)
IMM GRANULOCYTES BLD QL AUTO: 0.5 %
LYMPHOCYTES # SPEC AUTO: 1.45 X 10*3/UL (ref 0.9–5)
LYMPHOCYTES NFR SPEC AUTO: 15.1 %
MCH RBC QN AUTO: 29.8 PG (ref 27–32)
MCHC RBC AUTO-ENTMCNC: 32.6 G/DL (ref 32–37)
MCV RBC AUTO: 91.7 FL (ref 80–97)
MONOCYTES # BLD AUTO: 0.55 X 10*3/UL (ref 0.2–1)
MONOCYTES NFR BLD AUTO: 5.7 %
NEUTROPHILS # BLD AUTO: 7.55 X 10*3/UL (ref 1.8–7.7)
NEUTROPHILS NFR BLD AUTO: 78.6 %
NRBC BLD AUTO-RTO: 0 /100 WBCS (ref 0–0)
PLATELET # BLD AUTO: 258 X 10*3/UL (ref 140–440)
POTASSIUM SERPL-SCNC: 4.1 MMOL/L (ref 3.5–5.5)
SODIUM SERPL-SCNC: 136 MMOL/L (ref 135–145)
WBC # BLD AUTO: 9.61 X 10*3/UL (ref 4.5–10)

## 2022-10-22 RX ADMIN — HEPARIN SODIUM SCH UNIT: 5000 INJECTION INTRAVENOUS; SUBCUTANEOUS at 08:05

## 2022-10-22 RX ADMIN — PIPERACILLIN AND TAZOBACTAM SCH MLS/HR: 3; .375 INJECTION, POWDER, FOR SOLUTION INTRAVENOUS at 08:04

## 2022-10-22 RX ADMIN — PIPERACILLIN AND TAZOBACTAM SCH MLS/HR: 3; .375 INJECTION, POWDER, FOR SOLUTION INTRAVENOUS at 00:05

## 2022-10-22 RX ADMIN — METRONIDAZOLE SCH MLS/HR: 500 INJECTION, SOLUTION INTRAVENOUS at 12:52

## 2022-10-22 RX ADMIN — KETOROLAC TROMETHAMINE SCH MG: 15 INJECTION, SOLUTION INTRAMUSCULAR; INTRAVENOUS at 21:37

## 2022-10-22 RX ADMIN — KETOROLAC TROMETHAMINE SCH MG: 15 INJECTION, SOLUTION INTRAMUSCULAR; INTRAVENOUS at 16:06

## 2022-10-22 RX ADMIN — KETOROLAC TROMETHAMINE SCH MG: 15 INJECTION, SOLUTION INTRAMUSCULAR; INTRAVENOUS at 03:48

## 2022-10-22 RX ADMIN — METRONIDAZOLE SCH MLS/HR: 500 INJECTION, SOLUTION INTRAVENOUS at 03:48

## 2022-10-22 RX ADMIN — HEPARIN SODIUM SCH UNIT: 5000 INJECTION INTRAVENOUS; SUBCUTANEOUS at 16:07

## 2022-10-22 RX ADMIN — PIPERACILLIN AND TAZOBACTAM SCH MLS/HR: 3; .375 INJECTION, POWDER, FOR SOLUTION INTRAVENOUS at 16:07

## 2022-10-22 RX ADMIN — HYDROMORPHONE HYDROCHLORIDE PRN MG: 1 INJECTION, SOLUTION INTRAMUSCULAR; INTRAVENOUS; SUBCUTANEOUS at 08:05

## 2022-10-22 RX ADMIN — DEXTROSE MONOHYDRATE, SODIUM CHLORIDE, AND POTASSIUM CHLORIDE SCH MLS/HR: 50; 4.5; 1.49 INJECTION, SOLUTION INTRAVENOUS at 05:07

## 2022-10-22 RX ADMIN — HYDROMORPHONE HYDROCHLORIDE PRN MG: 1 INJECTION, SOLUTION INTRAMUSCULAR; INTRAVENOUS; SUBCUTANEOUS at 17:17

## 2022-10-22 RX ADMIN — PANTOPRAZOLE SODIUM SCH MG: 40 INJECTION, POWDER, FOR SOLUTION INTRAVENOUS at 08:04

## 2022-10-22 RX ADMIN — HEPARIN SODIUM SCH UNIT: 5000 INJECTION INTRAVENOUS; SUBCUTANEOUS at 00:05

## 2022-10-22 RX ADMIN — DEXTROSE MONOHYDRATE, SODIUM CHLORIDE, AND POTASSIUM CHLORIDE SCH MLS/HR: 50; 4.5; 1.49 INJECTION, SOLUTION INTRAVENOUS at 16:13

## 2022-10-22 RX ADMIN — HEPARIN SODIUM SCH UNIT: 5000 INJECTION INTRAVENOUS; SUBCUTANEOUS at 23:59

## 2022-10-22 RX ADMIN — KETOROLAC TROMETHAMINE SCH MG: 15 INJECTION, SOLUTION INTRAMUSCULAR; INTRAVENOUS at 10:38

## 2022-10-22 RX ADMIN — METRONIDAZOLE SCH MLS/HR: 500 INJECTION, SOLUTION INTRAVENOUS at 19:59

## 2022-10-22 NOTE — P.PN
Subjective


Progress Note Date: 10/22/22














CHIEF COMPLAINT: Acute appendicitis with cecal tumor





HISTORY OF PRESENT ILLNESS: The patient is a 52-year-old female status post 

appendectomy incidental cecal tumor.  Family is at bedside.  Patient ambulates 

to the bathroom or sits in a chair.  No moderate activity.  She is passing 

flatus.  No nausea and vomiting.  Pain is controlled.  No bowel movement.  

Patient is requesting suppository.





ROS:  No fevers or chills.  No new chest pain.  No productive sputum





PHYSICAL EXAM: 


VITAL SIGNS: Reviewed


CONSTITUTIONAL:  Well developed and in no acute distress. 


EYES:  Conjuctivae without sclera icterus. Extraocular movements grossly intact.




HEAD, EARS, NOSE, THROAT: Moist buccal mucosa. Head is atraumatic, 

normocephalic. Hears conversational speech. No nasal drainage.


RESPIRATORY:  Non-labored respirations and equal bilateral excursions.


CARDIOVASCULAR:  Palpable 2+ radial pulses.  


ABDOMEN: No peritonitis


MUSCULOSKELETAL:  No gross deformity of the lower extremities noted.  No 

clubbing.  No cyanosis.


SKIN: Good skin turgor. Well perfused. 


NEUROLOGIC: Cranial nerves II through XII grossly intact.  No focal or 

lateralizing signs. 


PSYCH:  Appropriate affect.  Alert and oriented to person, place and time. 





CLINICAL LABS: Reviewed.  Hemoglobin down 11.9-9.7.  WBC normal 9.61





ASSESSMENT: 


1.  Appendicitis with cecal mass





PLAN: 


1.  Clear liquid diet


2.  At this time hold suppository pending recovery.





Objective





- Vital Signs


Vital signs: 


                                   Vital Signs











Temp  97.9 F   10/22/22 12:03


 


Pulse  52 L  10/22/22 12:03


 


Resp  16   10/22/22 12:03


 


BP  112/78   10/22/22 12:03


 


Pulse Ox  100   10/22/22 12:03


 


FiO2      








                                 Intake & Output











 10/21/22 10/22/22 10/22/22





 18:59 06:59 18:59


 


Intake Total 3825  350


 


Output Total 3640 1225 


 


Balance 185 -1225 350


 


Intake:   


 


  Intake, IV Titration 1675  





  Amount   


 


    ACETAMINOPHEN IV (For   





    ) 1,000 mg In Empty Bag 1   





    bag @ 400 mls/hr IVPB   





    Q6H PABLO Rx#:[id number]   


 


    D5-0.45% NaCl with KCl 1375  





    20Meq/l 1,000 ml @ 100   





    mls/hr IV .Q10H PABLO Rx#:   





    [id number]   


 


    metroNIDAZOLE-NS  200  





    mg In Saline 1 100ml.bag   





    @ 100 mls/hr IVPB Q8H [facility]   





    Rx#:[id number]   


 


  Oral 2150  350


 


Output:   


 


  Drainage 40  


 


    Left Abdomen 40  


 


  Urine 3600 1225 


 


Other:   


 


  Voiding Method Indwelling Catheter Indwelling Catheter 














- Labs


CBC & Chem 7: 


                                 10/22/22 07:38





                                 10/22/22 07:38


Labs: 


                  Abnormal Lab Results - Last 24 Hours (Table)











  10/22/22 Range/Units





  07:38 


 


RBC  3.25 L  (4.10-5.20)  X 10*6/uL


 


Hgb  9.7 L  (12.0-15.0)  g/dL


 


Hct  29.8 L  (37.2-46.3)  %


 


Immature Gran #  0.05 H  (0.00-0.04)  X 10*3/uL


 


Eosinophils #  0 L  (0.04-0.35)  X 10*3/uL








                      Microbiology - Last 24 Hours (Table)











 10/20/22 12:55 Blood Culture - Preliminary





 Blood    No Growth after 24 hours


 


 10/20/22 12:40 Blood Culture - Preliminary





 Blood    No Growth after 24 hours

## 2022-10-22 NOTE — P.PN
Subjective


Progress Note Date: 10/22/22





- Reason for Consult


Consult date: 10/21/22


Requesting physician: Raymon Antonio





- History of Present Illness





This is a 52-year-old female who I sent to the emergency room from my office 

with complaints of right lower quadrant abdominal pain that started yesterday 

morning.  Patient reports that she woke up with right lower quadrant abdominal 

pain that he continued to increase throughout the day.  She reports that the 

patient did not radiate.  She has been nauseous.  She's had a fever as high as 

101.4 at home.  Patient had a computed tomography scan that showed acute 

appendicitis with rupture.  She had leukocytosis, mild tachycardia on admission.

 Past surgical history includes  and uterine ablation.  She denies any 

cardiac history.  She was seen by Dr. Noble in consultation and taken to 

surgery for appendectomy





10/22/2022





Patient is seen today with surgery following and patient is post appendectomy 

and continued on IV antibiotics. Clear liquids for now until surgery advances. 

Encouraged increased activity as tolerated and continued IS use. Patient is 

afebrile and tolerating the clears. No reports of chest pain or shortness of 

breath. Follow up on labs. 





Physical exam:





GENERAL: This is a 52-year-old  female well developed, well nourished


HEENT: Head is atraumatic, normocephalic. Pupils are equal, round, and reactive 

to light. Sclerae anicteric. Conjunctivae are clear. Mucus membranes of the 

mouth are moist. Neck is supple. 


RESPIRATORY: diminished breath sounds bilaterally with no wheezes, rales, or 

rhonchi.  


CARDIOVASCULAR: S1 and S2 muffled


GASTROINTESTINAL: No distention noted.  Abdomen soft and round.  Normal active 

bowel sounds auscultated x 4 quadrants.  No pain or tenderness noted upon 

palpation.


INTEGUMENTARY: No cyanosis. No jaundice. No rashes noted. No cellulitis noted.


EXTREMITIES: 2+ peripheral pulses. No evidence of peripheral edema. No calf 

tenderness noted.


NEUROLOGIC:  Cranial nerves II-XII intact.


PSYCHIATRIC: Awake, alert, and oriented X 3. Appropriate affect. Intact 

judgement and insight.





Assessment:


 


Acute appendicitis with rupture, post op day one


History of anxiety/depression


Continued ongoing nicotine use


Obesity with a bmi of 31.6


History of migraines


GI and DVT prophylaxis


Full code








Plan: 





Continue postoperative care per surgical guidelines


Recommend to continue with IV antibiotics and follow up on labs


Recommend to continue with clear liquids and advance per surgery recommendations


Continue gentle hydrations and labs in the am


Encouraged increased activity as tolerated


Encouraged incentive spiromety use at least 10 times per hour.  


thank you for this consultation and we will continue to follow.





The impression and plan of care has been dictated as a scribe by Skylar Trammell,

nurse practitioner as directed.





Dr. Derrick MD


I have performed a history and examination and MDM of this patient, discussed 

the same with the dictator, and  has been documented as a scribe. Based on total

visit time,  I have performed more than 50% of the visit. Any additional 

findings or plans will be noted.





Objective





- Vital Signs


Vital signs: 


                                   Vital Signs











Temp  97.8 F   10/22/22 03:53


 


Pulse  55 L  10/22/22 03:53


 


Resp  16   10/22/22 03:53


 


BP  116/69   10/22/22 03:53


 


Pulse Ox  100   10/22/22 03:53


 


FiO2      








                                 Intake & Output











 10/21/22 10/22/22 10/22/22





 18:59 06:59 18:59


 


Intake Total 3825  


 


Output Total 3640 1225 


 


Balance 185 -1225 


 


Intake:   


 


  Intake, IV Titration 1675  





  Amount   


 


    ACETAMINOPHEN IV (For   





    ) 1,000 mg In Empty Bag 1   





    bag @ 400 mls/hr IVPB   





    Q6H PABLO Rx#:[id number]   


 


    D5-0.45% NaCl with KCl 1375  





    20Meq/l 1,000 ml @ 100   





    mls/hr IV .Q10H PABLO Rx#:   





    [id number]   


 


    metroNIDAZOLE-NS  200  





    mg In Saline 1 100ml.bag   





    @ 100 mls/hr IVPB Q8H PABLO   





    Rx#:[id number]   


 


  Oral 2150  


 


Output:   


 


  Drainage 40  


 


    Left Abdomen 40  


 


  Urine 3600 1225 


 


Other:   


 


  Voiding Method Indwelling Catheter Indwelling Catheter 














- Labs


CBC & Chem 7: 


                                 10/22/22 07:38





                                 10/22/22 07:38


Labs: 


                  Abnormal Lab Results - Last 24 Hours (Table)











  10/21/22 10/21/22 Range/Units





  06:44 06:44 


 


WBC   10.33 H  (4.50-10.00)  X 10*3/uL


 


RBC   4.00 L  (4.10-5.20)  X 10*6/uL


 


Hgb   11.9 L  (12.0-15.0)  g/dL


 


Hct   36.7 L  (37.2-46.3)  %


 


Neutrophils #   9.50 H  (1.80-7.70)  X 10*3/uL


 


Lymphocytes #   0.47 L  (0.90-5.00)  X 10*3/uL


 


Eosinophils #   0 L  (0.04-0.35)  X 10*3/uL


 


BUN  7.3 L   (9.0-27.0)  mg/dL


 


BUN/Creatinine Ratio  10.43 L   (12.00-20.00)  Ratio


 


Glucose  247 H   ()  mg/dL


 


Calcium  8.4 L   (8.7-10.3)  mg/dL








                      Microbiology - Last 24 Hours (Table)











 10/20/22 12:55 Blood Culture - Preliminary





 Blood    No Growth after 24 hours


 


 10/20/22 12:40 Blood Culture - Preliminary





 Blood    No Growth after 24 hours

## 2022-10-23 LAB
ANION GAP SERPL CALC-SCNC: 8 MMOL/L
BASOPHILS # BLD AUTO: 0 K/UL (ref 0–0.2)
BASOPHILS NFR BLD AUTO: 0 %
BUN SERPL-SCNC: 8 MG/DL (ref 7–17)
CALCIUM SPEC-MCNC: 7.8 MG/DL (ref 8.4–10.2)
CHLORIDE SERPL-SCNC: 108 MMOL/L (ref 98–107)
CO2 SERPL-SCNC: 23 MMOL/L (ref 22–30)
EOSINOPHIL # BLD AUTO: 0 K/UL (ref 0–0.7)
EOSINOPHIL NFR BLD AUTO: 1 %
ERYTHROCYTE [DISTWIDTH] IN BLOOD BY AUTOMATED COUNT: 3.39 M/UL (ref 3.8–5.4)
ERYTHROCYTE [DISTWIDTH] IN BLOOD: 13.5 % (ref 11.5–15.5)
GLUCOSE SERPL-MCNC: 114 MG/DL (ref 74–99)
HCT VFR BLD AUTO: 30.3 % (ref 34–46)
HGB BLD-MCNC: 10.1 GM/DL (ref 11.4–16)
LYMPHOCYTES # SPEC AUTO: 1.1 K/UL (ref 1–4.8)
LYMPHOCYTES NFR SPEC AUTO: 15 %
MCH RBC QN AUTO: 29.9 PG (ref 25–35)
MCHC RBC AUTO-ENTMCNC: 33.4 G/DL (ref 31–37)
MCV RBC AUTO: 89.5 FL (ref 80–100)
MONOCYTES # BLD AUTO: 0.3 K/UL (ref 0–1)
MONOCYTES NFR BLD AUTO: 4 %
NEUTROPHILS # BLD AUTO: 5.6 K/UL (ref 1.3–7.7)
NEUTROPHILS NFR BLD AUTO: 80 %
PLATELET # BLD AUTO: 264 K/UL (ref 150–450)
POTASSIUM SERPL-SCNC: 4.1 MMOL/L (ref 3.5–5.1)
SODIUM SERPL-SCNC: 139 MMOL/L (ref 137–145)
WBC # BLD AUTO: 7.1 K/UL (ref 3.8–10.6)

## 2022-10-23 RX ADMIN — METRONIDAZOLE SCH MLS/HR: 500 INJECTION, SOLUTION INTRAVENOUS at 19:56

## 2022-10-23 RX ADMIN — HEPARIN SODIUM SCH UNIT: 5000 INJECTION INTRAVENOUS; SUBCUTANEOUS at 23:51

## 2022-10-23 RX ADMIN — DEXTROSE MONOHYDRATE, SODIUM CHLORIDE, AND POTASSIUM CHLORIDE SCH MLS/HR: 50; 4.5; 1.49 INJECTION, SOLUTION INTRAVENOUS at 23:51

## 2022-10-23 RX ADMIN — PIPERACILLIN AND TAZOBACTAM SCH MLS/HR: 3; .375 INJECTION, POWDER, FOR SOLUTION INTRAVENOUS at 15:42

## 2022-10-23 RX ADMIN — ONDANSETRON PRN MG: 2 INJECTION INTRAMUSCULAR; INTRAVENOUS at 17:41

## 2022-10-23 RX ADMIN — HEPARIN SODIUM SCH UNIT: 5000 INJECTION INTRAVENOUS; SUBCUTANEOUS at 15:41

## 2022-10-23 RX ADMIN — HEPARIN SODIUM SCH UNIT: 5000 INJECTION INTRAVENOUS; SUBCUTANEOUS at 08:25

## 2022-10-23 RX ADMIN — DEXTROSE MONOHYDRATE, SODIUM CHLORIDE, AND POTASSIUM CHLORIDE SCH MLS/HR: 50; 4.5; 1.49 INJECTION, SOLUTION INTRAVENOUS at 03:55

## 2022-10-23 RX ADMIN — METRONIDAZOLE SCH MLS/HR: 500 INJECTION, SOLUTION INTRAVENOUS at 03:52

## 2022-10-23 RX ADMIN — KETOROLAC TROMETHAMINE SCH MG: 15 INJECTION, SOLUTION INTRAMUSCULAR; INTRAVENOUS at 03:52

## 2022-10-23 RX ADMIN — PIPERACILLIN AND TAZOBACTAM SCH MLS/HR: 3; .375 INJECTION, POWDER, FOR SOLUTION INTRAVENOUS at 08:24

## 2022-10-23 RX ADMIN — KETOROLAC TROMETHAMINE SCH MG: 15 INJECTION, SOLUTION INTRAMUSCULAR; INTRAVENOUS at 10:53

## 2022-10-23 RX ADMIN — PIPERACILLIN AND TAZOBACTAM SCH MLS/HR: 3; .375 INJECTION, POWDER, FOR SOLUTION INTRAVENOUS at 00:00

## 2022-10-23 RX ADMIN — KETOROLAC TROMETHAMINE SCH MG: 15 INJECTION, SOLUTION INTRAMUSCULAR; INTRAVENOUS at 22:06

## 2022-10-23 RX ADMIN — DEXTROSE MONOHYDRATE, SODIUM CHLORIDE, AND POTASSIUM CHLORIDE SCH MLS/HR: 50; 4.5; 1.49 INJECTION, SOLUTION INTRAVENOUS at 15:48

## 2022-10-23 RX ADMIN — METRONIDAZOLE SCH MLS/HR: 500 INJECTION, SOLUTION INTRAVENOUS at 12:39

## 2022-10-23 RX ADMIN — KETOROLAC TROMETHAMINE SCH MG: 15 INJECTION, SOLUTION INTRAMUSCULAR; INTRAVENOUS at 15:42

## 2022-10-23 RX ADMIN — PIPERACILLIN AND TAZOBACTAM SCH MLS/HR: 3; .375 INJECTION, POWDER, FOR SOLUTION INTRAVENOUS at 23:52

## 2022-10-23 RX ADMIN — PANTOPRAZOLE SODIUM SCH MG: 40 INJECTION, POWDER, FOR SOLUTION INTRAVENOUS at 08:24

## 2022-10-23 NOTE — P.PN
Subjective


Progress Note Date: 10/23/22














CHIEF COMPLAINT: Acute appendicitis with cecal tumor





HISTORY OF PRESENT ILLNESS: The patient is a 52-year-old female status post 

appendectomy incidental cecal tumor.  Family is at bedside.  She reports passing

flatus and having bowel movement.  She tolerated a clear liquid.  She reports 

minimal output from MARIA M.  She wants removal of her MARIA M drain.





ROS:  No fevers or chills.  No new chest pain.  No productive sputum





PHYSICAL EXAM: 


VITAL SIGNS: Reviewed


CONSTITUTIONAL:  Well developed and in no acute distress. 


EYES:  Conjuctivae without sclera icterus. Extraocular movements grossly intact.




HEAD, EARS, NOSE, THROAT: Moist buccal mucosa. Head is atraumatic, 

normocephalic. Hears conversational speech. No nasal drainage.


RESPIRATORY:  Non-labored respirations and equal bilateral excursions.


CARDIOVASCULAR:  Palpable 2+ radial pulses.  


ABDOMEN: No peritonitis.  MARIA M seropurulent


MUSCULOSKELETAL:  No gross deformity of the lower extremities noted.  No 

clubbing.  No cyanosis.


SKIN: Good skin turgor. Well perfused. 


NEUROLOGIC: Cranial nerves II through XII grossly intact.  No focal or lateral

izing signs. 


PSYCH:  Appropriate affect.  Alert and oriented to person, place and time. 





CLINICAL LABS: Reviewed.  Hemoglobin of 9.7-10.1.  WBC normal 9.6-7.1.





ASSESSMENT: 


1.  Appendicitis with cecal mass





PLAN: 


1.  Advanced slowly to full liquid diet.


2.  Continue MARIA M drain





Objective





- Vital Signs


Vital signs: 


                                   Vital Signs











Temp  98.6 F   10/23/22 11:20


 


Pulse  87   10/23/22 11:20


 


Resp  18   10/23/22 11:20


 


BP  122/84   10/23/22 11:20


 


Pulse Ox  98   10/23/22 11:20


 


FiO2      








                                 Intake & Output











 10/22/22 10/23/22 10/23/22





 18:59 06:59 18:59


 


Intake Total 890 2100 


 


Output Total 0  


 


Balance 890 2100 


 


Intake:   


 


  Intake, IV Titration  1500 





  Amount   


 


    D5-0.45% NaCl with KCl  1200 





    20Meq/l 1,000 ml @ 100   





    mls/hr IV .Q10H PABLO Rx#:   





    [id number]   


 


    Piperacillin-Tazobactam 3  100 





    .375 gm In Sodium   





    Chloride 0.9% 100 ml @ 25   





    mls/hr IVPB Q8HR PABLO Rx#   





    :[id number]   


 


    metroNIDAZOLE-NS   200 





    mg In Saline 1 100ml.bag   





    @ 100 mls/hr IVPB Q8H [facility]   





    Rx#:[id number]   


 


  Oral 890 600 


 


Output:   


 


  Drainage 0  


 


    Left Abdomen 0  


 


Other:   


 


  Voiding Method  Toilet 


 


  # Voids 2 3 2














- Labs


CBC & Chem 7: 


                                 10/23/22 07:57





                                 10/23/22 07:57


Labs: 


                  Abnormal Lab Results - Last 24 Hours (Table)











  10/23/22 10/23/22 Range/Units





  07:57 07:57 


 


RBC  3.39 L   (3.80-5.40)  m/uL


 


Hgb  10.1 L D   (11.4-16.0)  gm/dL


 


Hct  30.3 L   (34.0-46.0)  %


 


Chloride   108 H  ()  mmol/L


 


Glucose   114 H  (74-99)  mg/dL


 


Calcium   7.8 L  (8.4-10.2)  mg/dL








                      Microbiology - Last 24 Hours (Table)











 10/20/22 12:55 Blood Culture - Preliminary





 Blood    No Growth after 48 hours


 


 10/20/22 12:40 Blood Culture - Preliminary





 Blood    No Growth after 48 hours

## 2022-10-24 LAB
ANION GAP SERPL CALC-SCNC: 9 MMOL/L (ref 10–18)
BASOPHILS # BLD AUTO: 0.02 X 10*3/UL (ref 0–0.1)
BASOPHILS NFR BLD AUTO: 0.3 %
BUN SERPL-SCNC: 7 MG/DL (ref 9–27)
BUN/CREAT SERPL: 8.75 RATIO (ref 12–20)
CALCIUM SPEC-MCNC: 8.5 MG/DL (ref 8.7–10.3)
CHLORIDE SERPL-SCNC: 107 MMOL/L (ref 96–109)
CO2 SERPL-SCNC: 24 MMOL/L (ref 20–27.5)
EOSINOPHIL # BLD AUTO: 0.1 X 10*3/UL (ref 0.04–0.35)
EOSINOPHIL NFR BLD AUTO: 1.4 %
ERYTHROCYTE [DISTWIDTH] IN BLOOD BY AUTOMATED COUNT: 3.72 X 10*6/UL (ref 4.1–5.2)
ERYTHROCYTE [DISTWIDTH] IN BLOOD: 14 % (ref 11.5–14.5)
GLUCOSE SERPL-MCNC: 114 MG/DL (ref 70–110)
HCT VFR BLD AUTO: 34 % (ref 37.2–46.3)
HGB BLD-MCNC: 10.8 G/DL (ref 12–15)
IMM GRANULOCYTES BLD QL AUTO: 0.7 %
LYMPHOCYTES # SPEC AUTO: 1.28 X 10*3/UL (ref 0.9–5)
LYMPHOCYTES NFR SPEC AUTO: 18 %
MAGNESIUM SPEC-SCNC: 2 MG/DL (ref 1.5–2.4)
MCH RBC QN AUTO: 29 PG (ref 27–32)
MCHC RBC AUTO-ENTMCNC: 31.8 G/DL (ref 32–37)
MCV RBC AUTO: 91.4 FL (ref 80–97)
MONOCYTES # BLD AUTO: 0.57 X 10*3/UL (ref 0.2–1)
MONOCYTES NFR BLD AUTO: 8 %
NEUTROPHILS # BLD AUTO: 5.1 X 10*3/UL (ref 1.8–7.7)
NEUTROPHILS NFR BLD AUTO: 71.6 %
NRBC BLD AUTO-RTO: 0 /100 WBCS (ref 0–0)
PLATELET # BLD AUTO: 302 X 10*3/UL (ref 140–440)
POTASSIUM SERPL-SCNC: 4.5 MMOL/L (ref 3.5–5.5)
SODIUM SERPL-SCNC: 140 MMOL/L (ref 135–145)
WBC # BLD AUTO: 7.12 X 10*3/UL (ref 4.5–10)

## 2022-10-24 RX ADMIN — HEPARIN SODIUM SCH UNIT: 5000 INJECTION INTRAVENOUS; SUBCUTANEOUS at 16:34

## 2022-10-24 RX ADMIN — DEXTROSE MONOHYDRATE, SODIUM CHLORIDE, AND POTASSIUM CHLORIDE SCH MLS/HR: 50; 4.5; 1.49 INJECTION, SOLUTION INTRAVENOUS at 13:12

## 2022-10-24 RX ADMIN — PANTOPRAZOLE SODIUM SCH MG: 40 INJECTION, POWDER, FOR SOLUTION INTRAVENOUS at 09:04

## 2022-10-24 RX ADMIN — HEPARIN SODIUM SCH UNIT: 5000 INJECTION INTRAVENOUS; SUBCUTANEOUS at 09:06

## 2022-10-24 RX ADMIN — KETOROLAC TROMETHAMINE SCH MG: 15 INJECTION, SOLUTION INTRAMUSCULAR; INTRAVENOUS at 09:14

## 2022-10-24 RX ADMIN — METRONIDAZOLE SCH MLS/HR: 500 INJECTION, SOLUTION INTRAVENOUS at 13:11

## 2022-10-24 RX ADMIN — METRONIDAZOLE SCH MLS/HR: 500 INJECTION, SOLUTION INTRAVENOUS at 03:43

## 2022-10-24 RX ADMIN — HYDROMORPHONE HYDROCHLORIDE PRN MG: 1 INJECTION, SOLUTION INTRAMUSCULAR; INTRAVENOUS; SUBCUTANEOUS at 09:14

## 2022-10-24 RX ADMIN — PIPERACILLIN AND TAZOBACTAM SCH MLS/HR: 3; .375 INJECTION, POWDER, FOR SOLUTION INTRAVENOUS at 09:06

## 2022-10-24 RX ADMIN — PIPERACILLIN AND TAZOBACTAM SCH MLS/HR: 3; .375 INJECTION, POWDER, FOR SOLUTION INTRAVENOUS at 16:34

## 2022-10-24 RX ADMIN — DEXTROSE MONOHYDRATE, SODIUM CHLORIDE, AND POTASSIUM CHLORIDE SCH MLS/HR: 50; 4.5; 1.49 INJECTION, SOLUTION INTRAVENOUS at 19:59

## 2022-10-24 RX ADMIN — HYDROCODONE BITARTRATE AND ACETAMINOPHEN PRN EACH: 5; 325 TABLET ORAL at 18:25

## 2022-10-24 RX ADMIN — METRONIDAZOLE SCH MLS/HR: 500 INJECTION, SOLUTION INTRAVENOUS at 19:59

## 2022-10-24 RX ADMIN — KETOROLAC TROMETHAMINE SCH MG: 15 INJECTION, SOLUTION INTRAMUSCULAR; INTRAVENOUS at 03:44

## 2022-10-24 NOTE — P.PN
Subjective


Progress Note Date: 10/23/22





- Reason for Consult


Consult date: 10/21/22


Requesting physician: Raymon Antonio





- History of Present Illness





This is a 52-year-old female who I sent to the emergency room from my office 

with complaints of right lower quadrant abdominal pain that started yesterday 

morning.  Patient reports that she woke up with right lower quadrant abdominal 

pain that he continued to increase throughout the day.  She reports that the 

patient did not radiate.  She has been nauseous.  She's had a fever as high as 

101.4 at home.  Patient had a computed tomography scan that showed acute 

appendicitis with rupture.  She had leukocytosis, mild tachycardia on admission.

 Past surgical history includes  and uterine ablation.  She denies any 

cardiac history.  She was seen by Dr. Noble in consultation and taken to 

surgery for appendectomy





10/22/2022





Patient is seen today with surgery following and patient is post appendectomy 

and continued on IV antibiotics. Clear liquids for now until surgery advances. 

Encouraged increased activity as tolerated and continued IS use. Patient is 

afebrile and tolerating the clears. No reports of chest pain or shortness of 

breath. Follow up on labs. 





10/23/2022





Patient is seen today maintained on clear liquid diet and tolerating, to advance

to full liquids per surgery,slowly. Patient reports passing gas and did have a 

very small bm today. Patient is afebrile and WBC is normalized. Continued on IV 

abx and will continue. Continue to encourage increased activity as tolerated thuan

ng with continued IS use at least 10 times per hour. Recommend repeat am labs. 





Physical exam:





GENERAL: This is a 52-year-old  female well developed, well nourished


HEENT: Head is atraumatic, normocephalic. Pupils are equal, round, and reactive 

to light. Sclerae anicteric. Conjunctivae are clear. Mucus membranes of the 

mouth are moist. Neck is supple. 


RESPIRATORY: diminished breath sounds bilaterally with no wheezes, rales, or 

rhonchi.  


CARDIOVASCULAR: S1 and S2 muffled


GASTROINTESTINAL: No distention noted.  Abdomen soft and round.  Normal active 

bowel sounds auscultated x 4 quadrants.  No pain or tenderness noted upon 

palpation.


INTEGUMENTARY: No cyanosis. No jaundice. No rashes noted. No cellulitis noted.


EXTREMITIES: 2+ peripheral pulses. No evidence of peripheral edema. No calf 

tenderness noted.


NEUROLOGIC:  Cranial nerves II-XII intact.


PSYCHIATRIC: Awake, alert, and oriented X 3. Appropriate affect. Intact 

judgement and insight.





Assessment:


 


Acute appendicitis with rupture, post op day 2


History of anxiety/depression


Continued ongoing nicotine use


Obesity with a bmi of 31.6


History of migraines


GI and DVT prophylaxis


Full code








Plan: 





Continue postoperative care per surgical guidelines


Recommend to continue with IV antibiotics and  labs reviewed. WBC is normal 

today


Recommend to continue with clear liquids and advance per surgery recommendations

to full liquids


Continue gentle hydrations and labs in the am


Encouraged increased activity as tolerated


Encouraged incentive spiromety use at least 10 times per hour.  


thank you for this consultation and we will continue to follow.





The impression and plan of care has been dictated as a scribe by Skylar Trammell,

nurse practitioner as directed.





Dr. Derrick MD


I have performed a history and examination and MDM of this patient, discussed 

the same with the dictator, and  has been documented as a scribe. Based on total

visit time,  I have performed more than 50% of the visit. Any additional 

findings or plans will be noted.





Objective





- Vital Signs


Vital signs: 


                                   Vital Signs











Temp  98.9 F   10/23/22 03:50


 


Pulse  78   10/23/22 03:50


 


Resp  16   10/23/22 03:50


 


BP  123/78   10/23/22 03:50


 


Pulse Ox  95   10/23/22 03:50


 


FiO2      








                                 Intake & Output











 10/22/22 10/23/22 10/23/22





 18:59 06:59 18:59


 


Intake Total 890 2100 


 


Output Total 0  


 


Balance 890 2100 


 


Intake:   


 


  Intake, IV Titration  1500 





  Amount   


 


    D5-0.45% NaCl with KCl  1200 





    20Meq/l 1,000 ml @ 100   





    mls/hr IV .Q10H PABLO Rx#:   





    [id number]   


 


    Piperacillin-Tazobactam 3  100 





    .375 gm In Sodium   





    Chloride 0.9% 100 ml @ 25   





    mls/hr IVPB Q8HR PABLO Rx#   





    :[id number]   


 


    metroNIDAZOLE-NS   200 





    mg In Saline 1 100ml.bag   





    @ 100 mls/hr IVPB Q8H PABLO   





    Rx#:[id number]   


 


  Oral 890 600 


 


Output:   


 


  Drainage 0  


 


    Left Abdomen 0  


 


Other:   


 


  Voiding Method  Toilet 


 


  # Voids 2 3 2














- Labs


CBC & Chem 7: 


                                 10/23/22 07:57





                                 10/23/22 07:57


Labs: 


                  Abnormal Lab Results - Last 24 Hours (Table)











  10/22/22 10/22/22 10/23/22 Range/Units





  07:38 07:38 07:57 


 


RBC  3.25 L   3.39 L  (4.10-5.20)  X 10*6/uL


 


Hgb  9.7 L   10.1 L D  (12.0-15.0)  g/dL


 


Hct  29.8 L   30.3 L  (37.2-46.3)  %


 


Immature Gran #  0.05 H    (0.00-0.04)  X 10*3/uL


 


Eosinophils #  0 L    (0.04-0.35)  X 10*3/uL


 


Chloride     ()  mmol/L


 


Anion Gap   8.50 L   (10.00-18.00)  mmol/L


 


Glucose     (74-99)  mg/dL


 


Calcium   8.4 L   (8.7-10.3)  mg/dL














  10/23/22 Range/Units





  07:57 


 


RBC   (4.10-5.20)  X 10*6/uL


 


Hgb   (12.0-15.0)  g/dL


 


Hct   (37.2-46.3)  %


 


Immature Gran #   (0.00-0.04)  X 10*3/uL


 


Eosinophils #   (0.04-0.35)  X 10*3/uL


 


Chloride  108 H  ()  mmol/L


 


Anion Gap   (10.00-18.00)  mmol/L


 


Glucose  114 H  (74-99)  mg/dL


 


Calcium  7.8 L  (8.7-10.3)  mg/dL








                      Microbiology - Last 24 Hours (Table)











 10/20/22 12:55 Blood Culture - Preliminary





 Blood    No Growth after 48 hours


 


 10/20/22 12:40 Blood Culture - Preliminary





 Blood    No Growth after 48 hours

## 2022-10-24 NOTE — P.PN
Subjective


Progress Note Date: 10/24/22





CHIEF COMPLAINT: Ruptured appendicitis with possible cecal tumor





HISTORY OF PRESENT ILLNESS: Patient is status post diagnostic laparoscopy 

converted to laparotomy with ileocecectomy and open repair of umbilical hernia. 

Patient does report abdominal pain.  She denies any nausea or vomiting.  She is 

having bowel movements and flatus.  Afebrile.  WBC is 7.12HB 10.8 platelets 302 

sodas 140 potassium 4.5 creatinine 0.8 magnesium 2.6.  Increased MARIA M drain output

with serosanguineous 190 mL output throughout the day yesterday and 70 mL output

this morning





PHYSICAL EXAM: 


VITAL SIGNS: Reviewed.


GENERAL: Well-developed in no acute distress. 


HEENT:  No sclera icterus. Extraocular movements grossly intact.  Moist buccal 

mucosa. Head is atraumatic, normocephalic. 


ABDOMEN:  Soft.  Nondistended.  Tenderness at incision site.  Incisional 

dressing clean dry and intact.  MARIA M drain with serosanguineous output


NEUROLOGIC: Alert and oriented. Cranial nerves II through XII grossly intact.





ASSESSMENT: 


1.  Ruptured appendicitis with possible cecal tumor status post diagnostic 

laparoscopy converted to laparotomy with ileocecectomy and open repair of 

umbilical hernia








PLAN: 


-Add Norco for oral pain medication


-Advance diet to low fiber tomorrow morning


-Continue to monitor MARIA M drain output


-Continue antibiotics


-Encourage patient to ambulate


-Incentive spirometer ordered


-GI prophylaxis Protonix and DVT prophylaxis subcu heparin





Physician Assistant note has been reviewed by physician. Signing provider agrees

with the documented findings, assessment, and plan of care. 





I have personally seen and examined the patient, reviewed the NP /PAs history, 

exam and MDM and agree with the assessment and plan as written. Based on total 

visit time, I have performed more than 50% of the visit.





As above:  Patient doing well today.  Only taking occasional Norco for pain.  

She had a low-grade fever last night.  MARIA M drain remained serous.  We'll send 

fluid for creatinine level.  Await pathology.  Continue diet as ordered.





Objective





- Vital Signs


Vital signs: 


                                   Vital Signs











Temp  98.2 F   10/24/22 11:22


 


Pulse  84   10/24/22 11:22


 


Resp  17   10/24/22 11:22


 


BP  100/68   10/24/22 11:22


 


Pulse Ox  96   10/24/22 11:22


 


FiO2      








                                 Intake & Output











 10/23/22 10/24/22 10/24/22





 18:59 06:59 18:59


 


Intake Total  1500 


 


Output Total  190 150


 


Balance  1310 -150


 


Intake:   


 


  Intake, IV Titration  1500 





  Amount   


 


    D5-0.45% NaCl with KCl  1200 





    20Meq/l 1,000 ml @ 100   





    mls/hr IV .Q10H PABLO Rx#:   





    [id number]   


 


    Piperacillin-Tazobactam 3  100 





    .375 gm In Sodium   





    Chloride 0.9% 100 ml @ 25   





    mls/hr IVPB Q8HR PABLO Rx#   





    :[id number]   


 


    metroNIDAZOLE-NS   200 





    mg In Saline 1 100ml.bag   





    @ 100 mls/hr IVPB Q8H PABLO   





    Rx#:[id number]   


 


Output:   


 


  Drainage  190 150


 


    Left Abdomen  190 150


 


Other:   


 


  Voiding Method  Toilet Toilet


 


  # Voids 3 2 2


 


  # Bowel Movements  1 














- Labs


CBC & Chem 7: 


                                 10/25/22 09:06





                                 10/25/22 09:06


Labs: 


                  Abnormal Lab Results - Last 24 Hours (Table)











  10/24/22 10/24/22 Range/Units





  04:57 04:57 


 


RBC  3.72 L   (4.10-5.20)  X 10*6/uL


 


Hgb  10.8 L   (12.0-15.0)  g/dL


 


Hct  34.0 L   (37.2-46.3)  %


 


MCHC  31.8 L   (32.0-37.0)  g/dL


 


MPV  9.2 L   (9.5-12.2)  fL


 


Immature Gran #  0.05 H   (0.00-0.04)  X 10*3/uL


 


Anion Gap   9.00 L  (10.00-18.00)  mmol/L


 


BUN   7.0 L  (9.0-27.0)  mg/dL


 


BUN/Creatinine Ratio   8.75 L  (12.00-20.00)  Ratio


 


Glucose   114 H  ()  mg/dL


 


Calcium   8.5 L  (8.7-10.3)  mg/dL








                      Microbiology - Last 24 Hours (Table)











 10/20/22 12:55 Blood Culture - Preliminary





 Blood    No Growth after 72 hours


 


 10/20/22 12:40 Blood Culture - Preliminary





 Blood    No Growth after 72 hours

## 2022-10-24 NOTE — P.PN
Subjective


Progress Note Date: 10/24/22


- History of Present Illness 10/21/22





his is a 52-year-old female who I sent to the emergency room from my office with

complaints of right lower quadrant abdominal pain that started yesterday 

morning.  Patient reports that she woke up with right lower quadrant abdominal 

pain that he continued to increase throughout the day.  She reports that the 

patient did not radiate.  She has been nauseous.  She's had a fever as high as 

101.4 at home.  Patient had a computed tomography scan that showed acute 

appendicitis with rupture.  She had leukocytosis, mild tachycardia on admission.

 Past surgical history includes  and uterine ablation.  She denies any 

cardiac history.  She was seen by Dr. Noble in consultation and taken to 

surgery for appendectomy.











10/24/2022 reports diarrhea this morning.  Nausea resolved.  Maintained on IV 

fluids and antibiotics of Flagyl and Zosyn.  Incentive spirometer up to 2500.   

Maintaining O2 sats in the high 90s on room air. Afebrile, normal WBC.  

Hemoglobin 10.8, platelets 302.  Renal function stable.  Reports surgical 

tenderness.  Denies chest pain, palpitations or shortness of breath. 











Objective





- Vital Signs


Vital signs: 


                                   Vital Signs











Temp  98.2 F   10/24/22 11:22


 


Pulse  84   10/24/22 11:22


 


Resp  17   10/24/22 11:22


 


BP  100/68   10/24/22 11:22


 


Pulse Ox  96   10/24/22 11:22


 


FiO2      








                                 Intake & Output











 10/23/22 10/24/22 10/24/22





 18:59 06:59 18:59


 


Intake Total  1500 


 


Output Total  190 150


 


Balance  1310 -150


 


Intake:   


 


  Intake, IV Titration  1500 





  Amount   


 


    D5-0.45% NaCl with KCl  1200 





    20Meq/l 1,000 ml @ 100   





    mls/hr IV .Q10H PABLO Rx#:   





    [id number]   


 


    Piperacillin-Tazobactam 3  100 





    .375 gm In Sodium   





    Chloride 0.9% 100 ml @ 25   





    mls/hr IVPB Q8HR PABLO Rx#   





    :[id number]   


 


    metroNIDAZOLE-NS   200 





    mg In Saline 1 100ml.bag   





    @ 100 mls/hr IVPB Q8H PABLO   





    Rx#:[id number]   


 


Output:   


 


  Drainage  190 150


 


    Left Abdomen  190 150


 


Other:   


 


  Voiding Method  Toilet Toilet


 


  # Voids 3 2 2


 


  # Bowel Movements  1 














- Exam





GENERAL: Alert and oriented 3, sitting up in bed, no acute distress


HEENT: Head is atraumatic, normocephalic. Pupils are equal, round, and reactive 

to light. Sclerae anicteric. Conjunctivae are clear. Mucus membranes of the 

mouth are moist. Neck is supple/no JVD. 


RESPIRATORY: Clear to auscultation. No wheezes, rales, or rhonchi.  


CARDIOVASCULAR: Regular rate and rhythm. S1 and S2 noted.  No systolic or 

diastolic murmur auscultated.  


GASTROINTESTINAL: No distention noted.  Abdomen soft and round.  Positive bowel 

sounds.  Status post surgery, abdominal binder present, MARIA M with serosanguineous 

drainage


INTEGUMENTARY: No cyanosis. No jaundice. No rashes noted. No cellulitis noted.


EXTREMITIES: 2+ peripheral pulses. No evidence of peripheral edema. No calf ten

derness noted.


NEUROLOGIC:  Cranial nerves II-XII intact.











- Labs


CBC & Chem 7: 


                                 10/24/22 04:57





                                 10/24/22 04:57


Labs: 


                  Abnormal Lab Results - Last 24 Hours (Table)











  10/24/22 10/24/22 Range/Units





  04:57 04:57 


 


RBC  3.72 L   (4.10-5.20)  X 10*6/uL


 


Hgb  10.8 L   (12.0-15.0)  g/dL


 


Hct  34.0 L   (37.2-46.3)  %


 


MCHC  31.8 L   (32.0-37.0)  g/dL


 


MPV  9.2 L   (9.5-12.2)  fL


 


Immature Gran #  0.05 H   (0.00-0.04)  X 10*3/uL


 


Anion Gap   9.00 L  (10.00-18.00)  mmol/L


 


BUN   7.0 L  (9.0-27.0)  mg/dL


 


BUN/Creatinine Ratio   8.75 L  (12.00-20.00)  Ratio


 


Glucose   114 H  ()  mg/dL


 


Calcium   8.5 L  (8.7-10.3)  mg/dL








                      Microbiology - Last 24 Hours (Table)











 10/20/22 12:40 Blood Culture - Preliminary





 Blood    No Growth after 96 hours


 


 10/20/22 12:55 Blood Culture - Preliminary





 Blood    No Growth after 96 hours














Assessment and Plan


Assessment: 


(1) Acute appendicitis with rupture with possible cecal tumor, status post 

diagnostic laparoscopy converted to laparotomy with ileocecectomy and open 

repair of umbilical hernia


Current Visit: Yes   Status: Acute   Code(s): K35.32 - AC APPENDICITIS W PERF, 

LOC PERITONITIS, & GANGR, W/O ABSCS   SNOMED Code(s): 06861337


   





(2) Anxiety


Current Visit: No   Status: Acute   Code(s): F41.9 - ANXIETY DISORDER, 

UNSPECIFIED   SNOMED Code(s): 24154154


   


Plan: Continue on current medication regime ,monitoring and symptomatic 

treatment.  Maintain antibiotics.  Aggressive pulmonary toileting with incentive

spirometer reinforced.  Diet advancement, pain management as per primary.  

Increase ambulation as tolerated.  DVT prophylaxis in place with heparin subcu, 

GI prophylaxis in place with PPI.











The impression and plan of care has been dictated as directed.





:


I performed a history and examination of this patient,  discussed the same with 

the dictator.  I agree with the dictator's note ,documented as a scribe.  Any 

additional findings or plans will be noted.

## 2022-10-25 LAB
ANION GAP SERPL CALC-SCNC: 8 MMOL/L
BASOPHILS # BLD AUTO: 0 K/UL (ref 0–0.2)
BASOPHILS NFR BLD AUTO: 0 %
BUN SERPL-SCNC: 6 MG/DL (ref 7–17)
CALCIUM SPEC-MCNC: 7.7 MG/DL (ref 8.4–10.2)
CHLORIDE SERPL-SCNC: 105 MMOL/L (ref 98–107)
CO2 SERPL-SCNC: 22 MMOL/L (ref 22–30)
EOSINOPHIL # BLD AUTO: 0.1 K/UL (ref 0–0.7)
EOSINOPHIL NFR BLD AUTO: 3 %
ERYTHROCYTE [DISTWIDTH] IN BLOOD BY AUTOMATED COUNT: 3.77 M/UL (ref 3.8–5.4)
ERYTHROCYTE [DISTWIDTH] IN BLOOD: 13.5 % (ref 11.5–15.5)
GLUCOSE SERPL-MCNC: 125 MG/DL (ref 74–99)
HCT VFR BLD AUTO: 34.4 % (ref 34–46)
HGB BLD-MCNC: 11.3 GM/DL (ref 11.4–16)
LYMPHOCYTES # SPEC AUTO: 0.9 K/UL (ref 1–4.8)
LYMPHOCYTES NFR SPEC AUTO: 25 %
MCH RBC QN AUTO: 30 PG (ref 25–35)
MCHC RBC AUTO-ENTMCNC: 32.9 G/DL (ref 31–37)
MCV RBC AUTO: 91.3 FL (ref 80–100)
MONOCYTES # BLD AUTO: 0.3 K/UL (ref 0–1)
MONOCYTES NFR BLD AUTO: 9 %
NEUTROPHILS # BLD AUTO: 2.2 K/UL (ref 1.3–7.7)
NEUTROPHILS NFR BLD AUTO: 61 %
PLATELET # BLD AUTO: 330 K/UL (ref 150–450)
POTASSIUM SERPL-SCNC: 4.7 MMOL/L (ref 3.5–5.1)
SODIUM SERPL-SCNC: 135 MMOL/L (ref 137–145)
WBC # BLD AUTO: 3.5 K/UL (ref 3.8–10.6)

## 2022-10-25 RX ADMIN — HEPARIN SODIUM SCH: 5000 INJECTION INTRAVENOUS; SUBCUTANEOUS at 23:17

## 2022-10-25 RX ADMIN — PIPERACILLIN AND TAZOBACTAM SCH MLS/HR: 3; .375 INJECTION, POWDER, FOR SOLUTION INTRAVENOUS at 23:20

## 2022-10-25 RX ADMIN — HEPARIN SODIUM SCH UNIT: 5000 INJECTION INTRAVENOUS; SUBCUTANEOUS at 09:25

## 2022-10-25 RX ADMIN — PIPERACILLIN AND TAZOBACTAM SCH MLS/HR: 3; .375 INJECTION, POWDER, FOR SOLUTION INTRAVENOUS at 00:03

## 2022-10-25 RX ADMIN — PIPERACILLIN AND TAZOBACTAM SCH MLS/HR: 3; .375 INJECTION, POWDER, FOR SOLUTION INTRAVENOUS at 16:07

## 2022-10-25 RX ADMIN — HEPARIN SODIUM SCH UNIT: 5000 INJECTION INTRAVENOUS; SUBCUTANEOUS at 16:07

## 2022-10-25 RX ADMIN — ONDANSETRON PRN MG: 2 INJECTION INTRAMUSCULAR; INTRAVENOUS at 09:29

## 2022-10-25 RX ADMIN — METRONIDAZOLE SCH MLS/HR: 500 INJECTION, SOLUTION INTRAVENOUS at 12:11

## 2022-10-25 RX ADMIN — HEPARIN SODIUM SCH UNIT: 5000 INJECTION INTRAVENOUS; SUBCUTANEOUS at 00:03

## 2022-10-25 RX ADMIN — DEXTROSE MONOHYDRATE, SODIUM CHLORIDE, AND POTASSIUM CHLORIDE SCH MLS/HR: 50; 4.5; 1.49 INJECTION, SOLUTION INTRAVENOUS at 05:57

## 2022-10-25 RX ADMIN — METRONIDAZOLE SCH MLS/HR: 500 INJECTION, SOLUTION INTRAVENOUS at 20:21

## 2022-10-25 RX ADMIN — ONDANSETRON PRN MG: 2 INJECTION INTRAMUSCULAR; INTRAVENOUS at 16:14

## 2022-10-25 RX ADMIN — PANTOPRAZOLE SODIUM SCH MG: 40 INJECTION, POWDER, FOR SOLUTION INTRAVENOUS at 09:25

## 2022-10-25 RX ADMIN — METRONIDAZOLE SCH MLS/HR: 500 INJECTION, SOLUTION INTRAVENOUS at 03:39

## 2022-10-25 RX ADMIN — PIPERACILLIN AND TAZOBACTAM SCH MLS/HR: 3; .375 INJECTION, POWDER, FOR SOLUTION INTRAVENOUS at 09:25

## 2022-10-25 NOTE — P.PN
Subjective


Progress Note Date: 10/25/22





CHIEF COMPLAINT: Ruptured appendicitis with possible cecal tumor





HISTORY OF PRESENT ILLNESS: Patient is status post diagnostic laparoscopy 

converted to laparotomy with ileocecectomy and open repair of umbilical hernia. 

Patient is sitting on the bedside chair.  She reports her pain is controlled.  

She is having diarrhea.  She did have a low-grade temp of 100.2 last night.  WBC

is 3.5 Hgb 11.3 platelets 3:30 sodium is 135 potassium is 4.7 creatinine 0.68.  

MARIA M drain with 150 mL of serosanguineous fluid.  180 mL of more serious output 

this morning.  Patient still having leaking noted around MARIA M drain





PHYSICAL EXAM: 


VITAL SIGNS: Reviewed.


GENERAL: Well-developed in no acute distress. 


HEENT:  No sclera icterus. Extraocular movements grossly intact.  Moist buccal 

mucosa. Head is atraumatic, normocephalic. 


ABDOMEN:  Soft.  Nondistended.  Tenderness at incision site.  Incisional 

dressing clean dry and intact.  MARIA M drain with serous output.  Leaking around the

MARIA M drain.


NEUROLOGIC: Alert and oriented. Cranial nerves II through XII grossly intact.





ASSESSMENT: 


1.  Ruptured appendicitis with possible cecal tumor status post diagnostic 

laparoscopy converted to laparotomy with ileocecectomy and open repair of 

umbilical hernia








PLAN: 


-Send MARIA M drain fluid for creatinine


-Continue pain medication as needed


-Continue to monitor MARIA M drain output


-Continue low fiber diet


-Continue antibiotics


-Encourage patient to ambulate


-Encourage patient to use incentive spirometer


-GI prophylaxis Protonix and DVT prophylaxis subcu heparin





Physician Assistant note has been reviewed by physician. Signing provider agrees

with the documented findings, assessment, and plan of care. 





I have personally seen and examined the patient, reviewed the NP /PAs history, 

exam and MDM and agree with the assessment and plan as written. Based on total 

visit time, I have performed more than 50% of the visit.





As above:  Patient doing well today.  Only taking occasional Norco for pain.  

She had a low-grade fever last night.  MARIA M drain remained serous.  We'll send 

fluid for creatinine level.  Await pathology.  Continue diet as ordered.





Objective





- Vital Signs


Vital signs: 


                                   Vital Signs











Temp  98.9 F   10/25/22 11:08


 


Pulse  85   10/25/22 11:08


 


Resp  18   10/25/22 11:08


 


BP  117/78   10/25/22 11:08


 


Pulse Ox  100   10/25/22 11:08


 


FiO2      








                                 Intake & Output











 10/24/22 10/25/22 10/25/22





 18:59 06:59 18:59


 


Intake Total  2000 


 


Output Total 150 100 80


 


Balance -150 1900 -80


 


Intake:   


 


  Intake, IV Titration  1500 





  Amount   


 


    D5-0.45% NaCl with KCl  1200 





    20Meq/l 1,000 ml @ 100   





    mls/hr IV .Q10H [facility] Rx#:   





    [id number]   


 


    Piperacillin-Tazobactam 3  100 





    .375 gm In Sodium   





    Chloride 0.9% 100 ml @ 25   





    mls/hr IVPB Q8HR [facility] Rx#   





    [id number]   


 


    metroNIDAZOLE-NS   200 





    mg In Saline 1 100ml.bag   





    @ 100 mls/hr IVPB Q8H [facility]   





    Rx#:[id number]   


 


  Oral  500 


 


Output:   


 


  Drainage 150 100 80


 


    Left Abdomen 150 100 80


 


Other:   


 


  Voiding Method Toilet Toilet 


 


  # Voids 2 3 


 


  # Bowel Movements 4 1 














- Labs


CBC & Chem 7: 


                                 10/25/22 09:06





                                 10/25/22 09:06


Labs: 


                  Abnormal Lab Results - Last 24 Hours (Table)











  10/25/22 10/25/22 Range/Units





  09:06 09:06 


 


WBC  3.5 L   (3.8-10.6)  k/uL


 


RBC  3.77 L   (3.80-5.40)  m/uL


 


Hgb  11.3 L   (11.4-16.0)  gm/dL


 


Lymphocytes #  0.9 L   (1.0-4.8)  k/uL


 


Sodium   135 L  (137-145)  mmol/L


 


BUN   6 L  (7-17)  mg/dL


 


Glucose   125 H  (74-99)  mg/dL


 


Calcium   7.7 L  (8.4-10.2)  mg/dL








                      Microbiology - Last 24 Hours (Table)











 10/20/22 12:40 Blood Culture - Preliminary





 Blood    No Growth after 96 hours


 


 10/20/22 12:55 Blood Culture - Preliminary





 Blood    No Growth after 96 hours

## 2022-10-26 LAB
ANION GAP SERPL CALC-SCNC: 10 MMOL/L
BUN SERPL-SCNC: 6 MG/DL (ref 7–17)
CALCIUM SPEC-MCNC: 8.2 MG/DL (ref 8.4–10.2)
CHLORIDE SERPL-SCNC: 101 MMOL/L (ref 98–107)
CO2 SERPL-SCNC: 26 MMOL/L (ref 22–30)
GLUCOSE SERPL-MCNC: 107 MG/DL (ref 74–99)
POTASSIUM SERPL-SCNC: 4.5 MMOL/L (ref 3.5–5.1)
SODIUM SERPL-SCNC: 137 MMOL/L (ref 137–145)

## 2022-10-26 RX ADMIN — KETOROLAC TROMETHAMINE SCH MG: 15 INJECTION, SOLUTION INTRAMUSCULAR; INTRAVENOUS at 16:59

## 2022-10-26 RX ADMIN — METRONIDAZOLE SCH MLS/HR: 500 INJECTION, SOLUTION INTRAVENOUS at 04:24

## 2022-10-26 RX ADMIN — ONDANSETRON PRN MG: 2 INJECTION INTRAMUSCULAR; INTRAVENOUS at 05:11

## 2022-10-26 RX ADMIN — PIPERACILLIN AND TAZOBACTAM SCH MLS/HR: 3; .375 INJECTION, POWDER, FOR SOLUTION INTRAVENOUS at 08:23

## 2022-10-26 RX ADMIN — METRONIDAZOLE SCH MLS/HR: 500 INJECTION, SOLUTION INTRAVENOUS at 22:19

## 2022-10-26 RX ADMIN — HEPARIN SODIUM SCH UNIT: 5000 INJECTION INTRAVENOUS; SUBCUTANEOUS at 08:23

## 2022-10-26 RX ADMIN — PANTOPRAZOLE SODIUM SCH MG: 40 INJECTION, POWDER, FOR SOLUTION INTRAVENOUS at 08:23

## 2022-10-26 RX ADMIN — ONDANSETRON PRN MG: 2 INJECTION INTRAMUSCULAR; INTRAVENOUS at 16:47

## 2022-10-26 RX ADMIN — PIPERACILLIN AND TAZOBACTAM SCH MLS/HR: 3; .375 INJECTION, POWDER, FOR SOLUTION INTRAVENOUS at 16:47

## 2022-10-26 RX ADMIN — HEPARIN SODIUM SCH UNIT: 5000 INJECTION INTRAVENOUS; SUBCUTANEOUS at 16:47

## 2022-10-26 RX ADMIN — METRONIDAZOLE SCH MLS/HR: 500 INJECTION, SOLUTION INTRAVENOUS at 13:48

## 2022-10-26 NOTE — P.PN
Subjective


Progress Note Date: 10/26/22





CHIEF COMPLAINT: Ruptured appendicitis with possible cecal tumor





HISTORY OF PRESENT ILLNESS: Patient is status post diagnostic laparoscopy 

converted to laparotomy with ileocecectomy and open repair of umbilical hernia. 

Patient reports her pain is okay.  She denies any nausea or vomiting.  She 

continues to have diarrhea.  Afebrile.  Sodium is 137 potassium is 4.5 

creatinine 0.77 fluid creatinine from MARIA M drain is 0.7.  MARIA M drain with 240 mL 

output yesterday and 30 mL output this morning





PHYSICAL EXAM: 


VITAL SIGNS: Reviewed.


GENERAL: Well-developed in no acute distress. 


HEENT:  No sclera icterus. Extraocular movements grossly intact.  Moist buccal 

mucosa. Head is atraumatic, normocephalic. 


ABDOMEN:  Soft.  Nondistended.  Tenderness at incision site.  Incisional 

dressing clean dry and intact.  MARIA M drain with serous output.  Leaking around the

MARIA M drain.


NEUROLOGIC: Alert and oriented. Cranial nerves II through XII grossly intact.





ASSESSMENT: 


1.  Ruptured appendicitis with possible cecal tumor status post diagnostic 

laparoscopy converted to laparotomy with ileocecectomy and open repair of 

umbilical hernia








PLAN: 


-Continue to monitor


-Continue pain medication as needed


-Continue to monitor MARIA M drain output


-Continue low fiber diet


-Continue antibiotics


-Encourage patient to ambulate


-Encourage patient to use incentive spirometer


-GI prophylaxis Protonix and DVT prophylaxis subcu heparin





Physician Assistant note has been reviewed by physician. Signing provider agrees

with the documented findings, assessment, and plan of care. 





I have personally seen and examined the patient, reviewed the NP /PAs history, 

exam and MDM and agree with the assessment and plan as written. Based on total 

visit time, I have performed more than 50% of the visit.





As above:  Patient still declines having significant pain although I have 

witnessed a few of the patient's abdominal cramps that usually leads to diarrhea

and she appears uncomfortable during that.  Overall patient says her discomforts

are definitely improving each day.  She has had some low-grade fevers.  MARIA M drain

creatinine level it looks good.  Abdominal exam reveals minimal tenderness and 

her incision is clean and dry.  There is some serous drainage around the drain 

site.  I did review the pathology results with her and unfortunately this does 

reveal a adenocarcinoma.  We'll plan outpatient oncologic evaluation as the 

patient's condition improves.  We'll repeat CBC tomorrow.  Monitor for fevers 

today.  Continue low fiber diet.  Will plan discharge tomorrow if crampy 

abdominal discomfort improved.





Objective





- Vital Signs


Vital signs: 


                                   Vital Signs











Temp  98.7 F   10/26/22 11:15


 


Pulse  75   10/26/22 11:15


 


Resp  17   10/26/22 11:15


 


BP  110/73   10/26/22 11:15


 


Pulse Ox  96   10/26/22 11:15


 


FiO2      








                                 Intake & Output











 10/25/22 10/26/22 10/26/22





 18:59 06:59 18:59


 


Intake Total 800  


 


Output Total 140 30 


 


Balance 660 -30 


 


Intake:   


 


  Intake, IV Titration 800  





  Amount   


 


    D5-0.45% NaCl with KCl 400  





    20Meq/l 1,000 ml @ 100   





    mls/hr IV .Q10H [facility] Rx#:   





    [id number]   


 


    Piperacillin-Tazobactam 3 200  





    .375 gm In Sodium   





    Chloride 0.9% 100 ml @ 25   





    mls/hr IVPB Q8HR [facility] Rx#   





    [id number]   


 


    metroNIDAZOLE-NS  200  





    mg In Saline 1 100ml.bag   





    @ 100 mls/hr IVPB Q8H [facility]   





    Rx#:[id number]   


 


Output:   


 


  Drainage 140 30 


 


    Left Abdomen 140 30 


 


Other:   


 


  Voiding Method  Toilet 


 


  # Voids  2 


 


  # Bowel Movements  2 














- Labs


CBC & Chem 7: 


                                 10/25/22 09:06





                                 10/26/22 08:41


Labs: 


                  Abnormal Lab Results - Last 24 Hours (Table)











  10/26/22 Range/Units





  08:41 


 


BUN  6 L  (7-17)  mg/dL


 


Glucose  107 H  (74-99)  mg/dL


 


Calcium  8.2 L  (8.4-10.2)  mg/dL








                      Microbiology - Last 24 Hours (Table)











 10/20/22 12:55 Blood Culture - Preliminary





 Blood    No Growth after 120 hours


 


 10/20/22 12:40 Blood Culture - Preliminary





 Blood    No Growth after 120 hours

## 2022-10-26 NOTE — P.PN
Subjective


Progress Note Date: 10/26/22


- History of Present Illness 10/21/22





his is a 52-year-old female who I sent to the emergency room from my office with

complaints of right lower quadrant abdominal pain that started yesterday 

morning.  Patient reports that she woke up with right lower quadrant abdominal 

pain that he continued to increase throughout the day.  She reports that the 

patient did not radiate.  She has been nauseous.  She's had a fever as high as 

101.4 at home.  Patient had a computed tomography scan that showed acute 

appendicitis with rupture.  She had leukocytosis, mild tachycardia on admission.

 Past surgical history includes  and uterine ablation.  She denies any 

cardiac history.  She was seen by Dr. Noble in consultation and taken to 

surgery for appendectomy.











10/24/2022 reports diarrhea this morning.  Nausea resolved.  Maintained on IV 

fluids and antibiotics of Flagyl and Zosyn.  Incentive spirometer up to 2500.   

Maintaining O2 sats in the high 90s on room air. Afebrile, normal WBC.  

Hemoglobin 10.8, platelets 302.  Renal function stable.  Reports surgical 

tenderness.  Denies chest pain, palpitations or shortness of breath. 





10/25/2022 pain controlled.  Reports diarrhea, maintained on gentle IV fluid 

hydration.  Continues on Zosyn, Flagyl. T-max 100.2, WBC 3.5.  Significant MARIA M 

serosanguineous drainage with leaking around the MARIA M drain.  Renal function 

stable  Maintaining O2 sats in the high 90s on room air.  Pathology pending.








10/26/2022 continues on Flagyl and Zosyn, T-max 100.2, renal function stable.  

Decreased MARIA M drainage, MARIA M fluid creatinine 0.7.  Pain controlled, mild nausea.  

Denies chest pain, palpitations or shortness of breath.





Objective





- Vital Signs


Vital signs: 


                                   Vital Signs











Temp  98.8 F   10/26/22 14:18


 


Pulse  87   10/26/22 14:18


 


Resp  16   10/26/22 14:18


 


BP  109/77   10/26/22 14:18


 


Pulse Ox  96   10/26/22 14:18


 


FiO2      








                                 Intake & Output











 10/25/22 10/26/22 10/26/22





 18:59 06:59 18:59


 


Intake Total 800  


 


Output Total 140 30 


 


Balance 660 -30 


 


Intake:   


 


  Intake, IV Titration 800  





  Amount   


 


    D5-0.45% NaCl with KCl 400  





    20Meq/l 1,000 ml @ 100   





    mls/hr IV .Q10H [facility] Rx#:   





    [id number]   


 


    Piperacillin-Tazobactam 3 200  





    .375 gm In Sodium   





    Chloride 0.9% 100 ml @ 25   





    mls/hr IVPB Q8HR [facility] Rx#   





    :[id number]   


 


    metroNIDAZOLE-NS  200  





    mg In Saline 1 100ml.bag   





    @ 100 mls/hr IVPB Q8H [facility]   





    Rx#:[id number]   


 


Output:   


 


  Drainage 140 30 


 


    Left Abdomen 140 30 


 


Other:   


 


  Voiding Method  Toilet 


 


  # Voids  2 


 


  # Bowel Movements  2 














- Exam





GENERAL: Alert and oriented 3, sitting up in bed, no acute distress


HEENT: Sclerae anicteric. Conjunctivae are clear. MMM.


Neck is supple/no JVD. 


RESPIRATORY: Clear to auscultation. No wheezes, rales, or rhonchi.  


CARDIOVASCULAR: Regular rate and rhythm. S1 and S2 noted.  No systolic or 

diastolic murmur auscultated.  


GASTROINTESTINAL: No distention noted.  Abdomen soft and round.  Positive bowel 

sounds.  Status post surgery, abdominal binder present, MARIA M with serosanguineous 

drainage, leaking around MARIA M drain present.


INTEGUMENTARY: No cyanosis. No jaundice. No rashes noted. No cellulitis noted.


EXTREMITIES: 2+ peripheral pulses. No evidence of peripheral edema. No calf 

tenderness noted.


NEUROLOGIC:  Cranial nerves II-XII intact.











- Labs


CBC & Chem 7: 


                                 10/25/22 09:06





                                 10/26/22 08:41


Labs: 


                  Abnormal Lab Results - Last 24 Hours (Table)











  10/26/22 Range/Units





  08:41 


 


BUN  6 L  (7-17)  mg/dL


 


Glucose  107 H  (74-99)  mg/dL


 


Calcium  8.2 L  (8.4-10.2)  mg/dL








                      Microbiology - Last 24 Hours (Table)











 10/20/22 12:55 Blood Culture - Final





 Blood    No Growth after 144 hours


 


 10/20/22 12:40 Blood Culture - Final





 Blood    No Growth after 144 hours














Assessment and Plan


Assessment: 


(1) Acute appendicitis with rupture with possible cecal tumor, status post 

diagnostic laparoscopy converted to laparotomy with ileocecectomy and open 

repair of umbilical hernia


Current Visit: Yes   Status: Acute   Code(s): K35.32 - AC APPENDICITIS W PERF, 

LOC PERITONITIS, & GANGR, W/O ABSCS   SNOMED Code(s): 53735504


   





(2) Anxiety


Current Visit: No   Status: Acute   Code(s): F41.9 - ANXIETY DISORDER, 

UNSPECIFIED   SNOMED Code(s): 26116645


   


Plan: Continue on current medication regime ,monitoring and symptomatic 

treatment. Pathology pending , maintain antibiotics. Pain management as per 

primary.  Aggressive pulmonary toileting with incentive spirometer reinforced. 

Increase ambulation as tolerated. 











The impression and plan of care has been dictated as directed.





:


I performed a history and examination of this patient,  discussed the same with 

the dictator.  I agree with the dictator's note ,documented as a scribe.  Any 

additional findings or plans will be noted.

## 2022-10-26 NOTE — P.PN
Subjective


Progress Note Date: 10/25/22


- History of Present Illness 10/21/22





his is a 52-year-old female who I sent to the emergency room from my office with

complaints of right lower quadrant abdominal pain that started yesterday 

morning.  Patient reports that she woke up with right lower quadrant abdominal 

pain that he continued to increase throughout the day.  She reports that the 

patient did not radiate.  She has been nauseous.  She's had a fever as high as 

101.4 at home.  Patient had a computed tomography scan that showed acute 

appendicitis with rupture.  She had leukocytosis, mild tachycardia on admission.

 Past surgical history includes  and uterine ablation.  She denies any 

cardiac history.  She was seen by Dr. Noble in consultation and taken to 

surgery for appendectomy.











10/24/2022 reports diarrhea this morning.  Nausea resolved.  Maintained on IV 

fluids and antibiotics of Flagyl and Zosyn.  Incentive spirometer up to 2500.   

Maintaining O2 sats in the high 90s on room air. Afebrile, normal WBC.  

Hemoglobin 10.8, platelets 302.  Renal function stable.  Reports surgical 

tenderness.  Denies chest pain, palpitations or shortness of breath. 





10/25/2022 pain controlled.  Reports diarrhea, maintained on gentle IV fluid 

hydration.  Continues on Zosyn, Flagyl. T-max 100.2, WBC 3.5.  Significant MARIA M 

serosanguineous drainage with leaking around the MARIA M drain.  Renal function 

stable  Maintaining O2 sats in the high 90s on room air.  Pathology pending.





Objective





- Vital Signs


Vital signs: 


                                   Vital Signs











Temp  98.2 F   10/25/22 04:03


 


Pulse  78   10/25/22 04:03


 


Resp  16   10/25/22 04:03


 


BP  107/73   10/25/22 04:03


 


Pulse Ox  96   10/25/22 04:03


 


FiO2      








                                 Intake & Output











 10/24/22 10/25/22 10/25/22





 18:59 06:59 18:59


 


Intake Total  2000 


 


Output Total 150 100 80


 


Balance -150 1900 -80


 


Intake:   


 


  Intake, IV Titration  1500 





  Amount   


 


    D5-0.45% NaCl with KCl  1200 





    20Meq/l 1,000 ml @ 100   





    mls/hr IV .Q10H [facility] Rx#:   





    [id number]   


 


    Piperacillin-Tazobactam 3  100 





    .375 gm In Sodium   





    Chloride 0.9% 100 ml @ 25   





    mls/hr IVPB Q8HR [facility] Rx#   





    :[id number]   


 


    metroNIDAZOLE-NS   200 





    mg In Saline 1 100ml.bag   





    @ 100 mls/hr IVPB Q8H [facility]   





    Rx#:[id number]   


 


  Oral  500 


 


Output:   


 


  Drainage 150 100 80


 


    Left Abdomen 150 100 80


 


Other:   


 


  Voiding Method Toilet Toilet 


 


  # Voids 2 3 


 


  # Bowel Movements 4 1 














- Exam





GENERAL: Alert and oriented 3, sitting up in bed, no acute distress


HEENT: Head is atraumatic, normocephalic. Pupils are equal, round, and reactive 

to light. Sclerae anicteric. Conjunctivae are clear. Mucus membranes of the 

mouth are moist. 


Neck is supple/no JVD. 


RESPIRATORY: Clear to auscultation. No wheezes, rales, or rhonchi.  


CARDIOVASCULAR: Regular rate and rhythm. S1 and S2 noted.  No systolic or 

diastolic murmur auscultated.  


GASTROINTESTINAL: No distention noted.  Abdomen soft and round.  Positive bowel 

sounds.  Status post surgery, abdominal binder present, MARIA M with serosanguineous 

drainage, leaking around MARIA M drain.


INTEGUMENTARY: No cyanosis. No jaundice. No rashes noted. No cellulitis noted.


EXTREMITIES: 2+ peripheral pulses. No evidence of peripheral edema. No calf 

tenderness noted.


NEUROLOGIC:  Cranial nerves II-XII intact.











- Labs


CBC & Chem 7: 


                                 10/25/22 09:06





                                 10/26/22 08:41


Labs: 


                  Abnormal Lab Results - Last 24 Hours (Table)











  10/25/22 10/25/22 Range/Units





  09:06 09:06 


 


WBC  3.5 L   (3.8-10.6)  k/uL


 


RBC  3.77 L   (3.80-5.40)  m/uL


 


Hgb  11.3 L   (11.4-16.0)  gm/dL


 


Lymphocytes #  0.9 L   (1.0-4.8)  k/uL


 


Sodium   135 L  (137-145)  mmol/L


 


BUN   6 L  (7-17)  mg/dL


 


Glucose   125 H  (74-99)  mg/dL


 


Calcium   7.7 L  (8.4-10.2)  mg/dL








                      Microbiology - Last 24 Hours (Table)











 10/20/22 12:40 Blood Culture - Preliminary





 Blood    No Growth after 96 hours


 


 10/20/22 12:55 Blood Culture - Preliminary





 Blood    No Growth after 96 hours














Assessment and Plan


Assessment: 


(1) Acute appendicitis with rupture with possible cecal tumor, status post 

diagnostic laparoscopy converted to laparotomy with ileocecectomy and open 

repair of umbilical hernia


Current Visit: Yes   Status: Acute   Code(s): K35.32 - AC APPENDICITIS W PERF, 

LOC PERITONITIS, & GANGR, W/O ABSCS   SNOMED Code(s): 05648756


   





(2) Anxiety


Current Visit: No   Status: Acute   Code(s): F41.9 - ANXIETY DISORDER, 

UNSPECIFIED   SNOMED Code(s): 17952768


   


Plan: Continue on current medication regime ,monitoring and symptomatic 

treatment.  Pathology pending , MARIA M drain fluid for creatinine pending .  

Continue on antibiotics.  Diet advancement, pain management as per primary.  

Aggressive pulmonary toileting with incentive spirometer reinforced. Increase am

bulation as tolerated. 











The impression and plan of care has been dictated as directed.





:


I performed a history and examination of this patient,  discussed the same with 

the dictator.  I agree with the dictator's note ,documented as a scribe.  Any 

additional findings or plans will be noted.

## 2022-10-27 LAB
BASOPHILS # BLD MANUAL: 0 X 10*3/UL (ref 0–0.1)
EOSINOPHIL # BLD MANUAL: 0.18 X 10*3/UL (ref 0.04–0.35)
ERYTHROCYTE [DISTWIDTH] IN BLOOD BY AUTOMATED COUNT: 3.84 X 10*6/UL (ref 4.1–5.2)
ERYTHROCYTE [DISTWIDTH] IN BLOOD: 13.9 % (ref 11.5–14.5)
HCT VFR BLD AUTO: 33.8 % (ref 37.2–46.3)
HGB BLD-MCNC: 11.2 G/DL (ref 12–15)
LYMPHOCYTES # BLD MANUAL: 0.45 X 10*3/UL (ref 0.9–5)
MCH RBC QN AUTO: 29.2 PG (ref 27–32)
MCHC RBC AUTO-ENTMCNC: 33.1 G/DL (ref 32–37)
MCV RBC AUTO: 88 FL (ref 80–97)
MONOCYTES # BLD MANUAL: 0.62 X 10*3/UL (ref 0.2–1)
NEUTROPHILS NFR BLD MANUAL: 69 %
NEUTS SEG # BLD MANUAL: 3.07 X 10*3/UL (ref 2–8.9)
NRBC BLD AUTO-RTO: 0 /100 WBCS (ref 0–0)
PLATELET # BLD AUTO: 345 X 10*3/UL (ref 140–440)
RBC MORPH BLD: NORMAL
WBC # BLD AUTO: 4.45 X 10*3/UL (ref 4.5–10)

## 2022-10-27 PROCEDURE — 0D9670Z DRAINAGE OF STOMACH WITH DRAINAGE DEVICE, VIA NATURAL OR ARTIFICIAL OPENING: ICD-10-PCS

## 2022-10-27 RX ADMIN — HEPARIN SODIUM SCH UNIT: 5000 INJECTION INTRAVENOUS; SUBCUTANEOUS at 00:37

## 2022-10-27 RX ADMIN — KETOROLAC TROMETHAMINE SCH: 15 INJECTION, SOLUTION INTRAMUSCULAR; INTRAVENOUS at 22:49

## 2022-10-27 RX ADMIN — KETOROLAC TROMETHAMINE SCH MG: 15 INJECTION, SOLUTION INTRAMUSCULAR; INTRAVENOUS at 11:35

## 2022-10-27 RX ADMIN — PIPERACILLIN AND TAZOBACTAM SCH MLS/HR: 3; .375 INJECTION, POWDER, FOR SOLUTION INTRAVENOUS at 16:06

## 2022-10-27 RX ADMIN — HYDROMORPHONE HYDROCHLORIDE PRN MG: 1 INJECTION, SOLUTION INTRAMUSCULAR; INTRAVENOUS; SUBCUTANEOUS at 16:15

## 2022-10-27 RX ADMIN — METRONIDAZOLE SCH MLS/HR: 500 INJECTION, SOLUTION INTRAVENOUS at 04:17

## 2022-10-27 RX ADMIN — HEPARIN SODIUM SCH: 5000 INJECTION INTRAVENOUS; SUBCUTANEOUS at 16:12

## 2022-10-27 RX ADMIN — METRONIDAZOLE SCH: 500 INJECTION, SOLUTION INTRAVENOUS at 22:49

## 2022-10-27 RX ADMIN — KETOROLAC TROMETHAMINE SCH MG: 15 INJECTION, SOLUTION INTRAMUSCULAR; INTRAVENOUS at 00:37

## 2022-10-27 RX ADMIN — KETOROLAC TROMETHAMINE SCH MG: 15 INJECTION, SOLUTION INTRAMUSCULAR; INTRAVENOUS at 06:01

## 2022-10-27 RX ADMIN — PIPERACILLIN AND TAZOBACTAM SCH MLS/HR: 3; .375 INJECTION, POWDER, FOR SOLUTION INTRAVENOUS at 00:37

## 2022-10-27 RX ADMIN — METRONIDAZOLE SCH MLS/HR: 500 INJECTION, SOLUTION INTRAVENOUS at 11:36

## 2022-10-27 RX ADMIN — HEPARIN SODIUM SCH UNIT: 5000 INJECTION INTRAVENOUS; SUBCUTANEOUS at 08:40

## 2022-10-27 RX ADMIN — PIPERACILLIN AND TAZOBACTAM SCH MLS/HR: 3; .375 INJECTION, POWDER, FOR SOLUTION INTRAVENOUS at 08:40

## 2022-10-27 RX ADMIN — PANTOPRAZOLE SODIUM SCH MG: 40 INJECTION, POWDER, FOR SOLUTION INTRAVENOUS at 08:39

## 2022-10-27 RX ADMIN — ONDANSETRON PRN MG: 2 INJECTION INTRAMUSCULAR; INTRAVENOUS at 00:37

## 2022-10-27 NOTE — P.PN
Subjective


Progress Note Date: 10/27/22





CHIEF COMPLAINT: Ruptured appendicitis with possible cecal tumor





HISTORY OF PRESENT ILLNESS: Patient is status post diagnostic laparoscopy 

converted to laparotomy with ileocecectomy and open repair of umbilical hernia. 

Patient initially had sharp right lower quadrant abdominal pain this morning 

that lasted only a few minutes and went away.  Apparently as the day has 

progressed she has continued to have right lower quadrant pain.  She denies any 

nausea or vomiting.  Afebrile.  WBC 4.47 Hgb 11.2 platelets 345 stool for C. 

diff negative.





PHYSICAL EXAM: 


VITAL SIGNS: Reviewed.


GENERAL: Well-developed in no acute distress. 


HEENT:  No sclera icterus. Extraocular movements grossly intact.  Moist buccal 

mucosa. Head is atraumatic, normocephalic. 


ABDOMEN:  Soft.  Nondistended.  Minimal tenderness at incision site.  Incisional

dressing clean dry and intact.  MARIA M drain with serous output.  Leaking around the

MARIA M drain.


NEUROLOGIC: Alert and oriented. Cranial nerves II through XII grossly intact.





ASSESSMENT: 


1.  Ruptured appendicitis with cecal tumor status post diagnostic laparoscopy 

converted to laparotomy with ileocecectomy and open repair of umbilical hernia


2.  Adenocarcinoma noted on pathology





PLAN: 


-Computed tomography scan abdomen and pelvis ordered to evaluate abdominal pain


-Continue to monitor


-Continue pain medication as needed


-Continue to monitor MARIA M drain output


-Continue low fiber diet


-Continue antibiotics


-Encourage patient to ambulate


-Encourage patient to use incentive spirometer


-GI prophylaxis Protonix and DVT prophylaxis subcu heparin





Physician Assistant note has been reviewed by physician. Signing provider agrees

with the documented findings, assessment, and plan of care. 





I have personally seen and examined the patient, reviewed the NP /PAs history, 

exam and MDM and agree with the assessment and plan as written. Based on total 

visit time, I have performed more than 50% of the visit.





As above:  Patient apparently has had some increased sharp pain right lower 

quadrant today.  She is afebrile with stable vital signs.  Will order CT with 

triple contrast to evaluate the anastomotic site.  Keep nothing by mouth for 

now.





Objective





- Vital Signs


Vital signs: 


                                   Vital Signs











Temp  99.0 F   10/27/22 11:46


 


Pulse  77   10/27/22 11:46


 


Resp  17   10/27/22 11:46


 


BP  103/68   10/27/22 11:46


 


Pulse Ox  97   10/27/22 11:46


 


FiO2      








                                 Intake & Output











 10/26/22 10/27/22 10/27/22





 18:59 06:59 18:59


 


Intake Total 300 300 


 


Output Total  65 


 


Balance 300 235 


 


Weight   94.347 kg


 


Intake:   


 


  Intake, IV Titration 300 300 





  Amount   


 


    Piperacillin-Tazobactam 3 200 100 





    .375 gm In Sodium   





    Chloride 0.9% 100 ml @ 25   





    mls/hr IVPB Q8HR [facility] Rx#   





    :[id number]   


 


    metroNIDAZOLE-NS  100 200 





    mg In Saline 1 100ml.bag   





    @ 100 mls/hr IVPB Q8H [facility]   





    Rx#:[id number]   


 


Output:   


 


  Drainage  65 


 


    Left Abdomen  65 


 


Other:   


 


  Voiding Method  Toilet 














- Labs


CBC & Chem 7: 


                                 10/27/22 05:58





                                 10/26/22 08:41


Labs: 


                  Abnormal Lab Results - Last 24 Hours (Table)











  10/27/22 Range/Units





  05:58 


 


WBC  4.45 L  (4.50-10.00)  X 10*3/uL


 


RBC  3.84 L  (4.10-5.20)  X 10*6/uL


 


Hgb  11.2 L  (12.0-15.0)  g/dL


 


Hct  33.8 L  (37.2-46.3)  %


 


MPV  8.6 L  (9.5-12.2)  fL


 


Metamyelocytes %  2 H  (0-0)  %


 


Myelocytes %  1 H  (0-0)  %


 


Lymphocytes # (Manual)  0.45 L  (0.90-5.00)  X 10*3/uL








                      Microbiology - Last 24 Hours (Table)











 10/20/22 12:55 Blood Culture - Final





 Blood    No Growth after 144 hours


 


 10/20/22 12:40 Blood Culture - Final





 Blood    No Growth after 144 hours

## 2022-10-27 NOTE — CT
EXAMINATION TYPE: CT ABDOMEN PELVIS W ORAL, RECTAL, AND IV CONTRAST 

 

DATE OF EXAM: 10/27/2022

 

HISTORY: abdominal tenderness

 

TECHNIQUE: Following delivery of oral and rectal contrast, pelvic CT imaging was obtained during the 
portal venous phase contrast enhancement. Following a 4 minute delay for the urogram phase, abdominop
elvic CT was repeated. Automated Exposure Control for Dose Reduction was Utilized.

CT DLP: 2382mGycm  

IV contrast: 100 mL Isovue 300.

 

COMPARISON: CT 10/20/2022

 

FINDINGS:

 

LUNG BASES: No acute findings.

 

LIVER/GB: Liver cysts. No other findings.

 

PANCREAS: No significant abnormality is seen.

 

SPLEEN: No significant abnormality is seen.

 

ADRENALS: No significant abnormality is seen.

 

KIDNEYS: No significant abnormality is seen.

 

BOWEL: Small bowel is diffusely dilated, with some loops reaching 5 cm caliber. There are some loops 
of distal ileum which have normal caliber, they are distended with fluid but are less than 3 cm calib
er. There is no small bowel mural thickening or mesenteric edematous changes. No pneumatosis or pneum
operitoneum. The colon is distended with contrast. Sigmoid diverticulosis noted. Ileocolic staples ar
e appreciated. There is no evidence of extraluminal contrast extravasation. No focal fluid or gas col
lection. 

 

PERITONEAL CAVITY: Minimal fluid seen dependently within pelvis. No pneumoperitoneum

 

EXTRAPERITONEAL SPACES: No greater than 1cm abdominal or pelvic lymph nodes.

 

OSSEOUS STRUCTURES: No significant abnormality is seen.

 

VASCULAR: No acute vascular findings. Flattened IVC noted.

 

Results discussed with ordering physician.

 

 

IMPRESSION: 

1.  Partial small bowel obstruction pattern.

2.  Flattened IVC noted, can correlate with a clinical diagnosis of relative hypovolemia.

## 2022-10-27 NOTE — P.PN
Subjective


Progress Note Date: 10/27/22


- History of Present Illness 10/21/22





his is a 52-year-old female who I sent to the emergency room from my office with

complaints of right lower quadrant abdominal pain that started yesterday 

morning.  Patient reports that she woke up with right lower quadrant abdominal 

pain that he continued to increase throughout the day.  She reports that the 

patient did not radiate.  She has been nauseous.  She's had a fever as high as 

101.4 at home.  Patient had a computed tomography scan that showed acute 

appendicitis with rupture.  She had leukocytosis, mild tachycardia on admission.

 Past surgical history includes  and uterine ablation.  She denies any 

cardiac history.  She was seen by Dr. Noble in consultation and taken to 

surgery for appendectomy.











10/24/2022 reports diarrhea this morning.  Nausea resolved.  Maintained on IV 

fluids and antibiotics of Flagyl and Zosyn.  Incentive spirometer up to 2500.   

Maintaining O2 sats in the high 90s on room air. Afebrile, normal WBC.  

Hemoglobin 10.8, platelets 302.  Renal function stable.  Reports surgical 

tenderness.  Denies chest pain, palpitations or shortness of breath. 





10/25/2022 pain controlled.  Reports diarrhea, maintained on gentle IV fluid 

hydration.  Continues on Zosyn, Flagyl. T-max 100.2, WBC 3.5.  Significant MARIA M 

serosanguineous drainage with leaking around the MARIA M drain.  Renal function 

stable  Maintaining O2 sats in the high 90s on room air.  Pathology pending.








10/26/2022 continues on Flagyl and Zosyn, T-max 100.2, renal function stable.  

Decreased MARIA M drainage, MARIA M fluid creatinine 0.7.  Pain controlled, mild nausea.  

Denies chest pain, palpitations or shortness of breath.








10/27/2022 .  Pathology reporting invasive well to moderately differentiated 

adenocarcinoma, margins negative.  Subserosal abscess with Necrosis, fibrosis 

and acute serositis consistent with perforated viscus.  Benign appendix without 

histopathologic changes.  5 mesenteric lymph nodes negative for metastasis.  

Surgical tenderness, pain improved.  Minimal nausea.  Low fiber Diet intake 

fair-reports she does not like hospital food.  Denies chest pain, palpitations 

or shortness of breath.  Afebrile, normal WBC.





Objective





- Vital Signs


Vital signs: 


                                   Vital Signs











Temp  98.6 F   10/27/22 04:31


 


Pulse  79   10/27/22 04:31


 


Resp  16   10/27/22 04:31


 


BP  102/68   10/27/22 04:31


 


Pulse Ox  98   10/27/22 04:31


 


FiO2      








                                 Intake & Output











 10/26/22 10/27/22 10/27/22





 18:59 06:59 18:59


 


Intake Total 300 300 


 


Output Total  65 


 


Balance 300 235 


 


Intake:   


 


  Intake, IV Titration 300 300 





  Amount   


 


    Piperacillin-Tazobactam 3 200 100 





    .375 gm In Sodium   





    Chloride 0.9% 100 ml @ 25   





    mls/hr IVPB Q8HR [facility] Rx#   





    :[id number]   


 


    metroNIDAZOLE-NS  100 200 





    mg In Saline 1 100ml.bag   





    @ 100 mls/hr IVPB Q8H [facility]   





    Rx#:[id number]   


 


Output:   


 


  Drainage  65 


 


    Left Abdomen  65 


 


Other:   


 


  Voiding Method  Toilet 














- Exam





GENERAL: Alert and oriented 3, sitting up in bed, no acute distress


HEENT: Sclerae anicteric. Conjunctivae are clear. MMM.


Neck is supple/no JVD. 


RESPIRATORY: Clear to auscultation. No wheezes, rales, or rhonchi.  


CARDIOVASCULAR: Regular rate and rhythm. S1 and S2 noted.  No systolic or 

diastolic murmur auscultated.  


GASTROINTESTINAL: No distention noted.  Abdomen soft and round.  Positive bowel 

sounds.  Status post surgery, abdominal binder present, MARIA M with serous drainage.


INTEGUMENTARY: No cyanosis. No jaundice. No rashes noted. No cellulitis noted.


EXTREMITIES: 2+ peripheral pulses. No evidence of peripheral edema. No calf 

tenderness noted.


NEUROLOGIC:  Cranial nerves II-XII intact.











- Labs


CBC & Chem 7: 


                                 10/27/22 05:58





                                 10/26/22 08:41


Labs: 


                  Abnormal Lab Results - Last 24 Hours (Table)











  10/26/22 10/27/22 Range/Units





  08:41 05:58 


 


WBC   4.45 L  (4.50-10.00)  X 10*3/uL


 


RBC   3.84 L  (4.10-5.20)  X 10*6/uL


 


Hgb   11.2 L  (12.0-15.0)  g/dL


 


Hct   33.8 L  (37.2-46.3)  %


 


MPV   8.6 L  (9.5-12.2)  fL


 


BUN  6 L   (7-17)  mg/dL


 


Glucose  107 H   (74-99)  mg/dL


 


Calcium  8.2 L   (8.4-10.2)  mg/dL








                      Microbiology - Last 24 Hours (Table)











 10/20/22 12:55 Blood Culture - Final





 Blood    No Growth after 144 hours


 


 10/20/22 12:40 Blood Culture - Final





 Blood    No Growth after 144 hours














Assessment and Plan


Assessment: 


(1) Acute appendicitis with rupture with possible cecal tumor, status post 

diagnostic laparoscopy converted to laparotomy with ileocecectomy and open 

repair of umbilical hernia.  Pathology reporting invasive well to moderately 

differentiated adenocarcinoma, margins negative, like mesenteric lymph nodes 

negative for metastasis.


Current Visit: Yes   Status: Acute   Code(s): K35.32 - AC APPENDICITIS W PERF, 

LOC PERITONITIS, & GANGR, W/O ABSCS   SNOMED Code(s): 01906248


   





(2) Anxiety


Current Visit: No   Status: Acute   Code(s): F41.9 - ANXIETY DISORDER, 

UNSPECIFIED   SNOMED Code(s): 03445012


   


Plan: Continue on current medication regime ,monitoring and symptomatic treatmen

t.  Oncology evaluation as per primary .  maintain aggressive pulmonary 

toileting with incentive spirometer reinforced.  Medically cleared for 

discharge.  Follow-up with PCP in one week.











The impression and plan of care has been dictated as directed.





:


I performed a history and examination of this patient,  discussed the same with 

the dictator.  I agree with the dictator's note ,documented as a scribe.  Any 

additional findings or plans will be noted.

## 2022-10-28 RX ADMIN — KETOROLAC TROMETHAMINE SCH MG: 15 INJECTION, SOLUTION INTRAMUSCULAR; INTRAVENOUS at 18:18

## 2022-10-28 RX ADMIN — HEPARIN SODIUM SCH UNIT: 5000 INJECTION INTRAVENOUS; SUBCUTANEOUS at 23:43

## 2022-10-28 RX ADMIN — PIPERACILLIN AND TAZOBACTAM SCH MLS/HR: 3; .375 INJECTION, POWDER, FOR SOLUTION INTRAVENOUS at 16:48

## 2022-10-28 RX ADMIN — PIPERACILLIN AND TAZOBACTAM SCH MLS/HR: 3; .375 INJECTION, POWDER, FOR SOLUTION INTRAVENOUS at 00:46

## 2022-10-28 RX ADMIN — HEPARIN SODIUM SCH UNIT: 5000 INJECTION INTRAVENOUS; SUBCUTANEOUS at 16:48

## 2022-10-28 RX ADMIN — PIPERACILLIN AND TAZOBACTAM SCH MLS/HR: 3; .375 INJECTION, POWDER, FOR SOLUTION INTRAVENOUS at 08:52

## 2022-10-28 RX ADMIN — PANTOPRAZOLE SODIUM SCH MG: 40 INJECTION, POWDER, FOR SOLUTION INTRAVENOUS at 08:52

## 2022-10-28 RX ADMIN — PIPERACILLIN AND TAZOBACTAM SCH MLS/HR: 3; .375 INJECTION, POWDER, FOR SOLUTION INTRAVENOUS at 23:41

## 2022-10-28 RX ADMIN — KETOROLAC TROMETHAMINE SCH MG: 15 INJECTION, SOLUTION INTRAMUSCULAR; INTRAVENOUS at 00:46

## 2022-10-28 RX ADMIN — HEPARIN SODIUM SCH UNIT: 5000 INJECTION INTRAVENOUS; SUBCUTANEOUS at 00:49

## 2022-10-28 RX ADMIN — CEFAZOLIN SCH MLS/HR: 330 INJECTION, POWDER, FOR SOLUTION INTRAMUSCULAR; INTRAVENOUS at 12:24

## 2022-10-28 RX ADMIN — HEPARIN SODIUM SCH UNIT: 5000 INJECTION INTRAVENOUS; SUBCUTANEOUS at 08:52

## 2022-10-28 RX ADMIN — KETOROLAC TROMETHAMINE SCH MG: 15 INJECTION, SOLUTION INTRAMUSCULAR; INTRAVENOUS at 06:39

## 2022-10-28 RX ADMIN — KETOROLAC TROMETHAMINE SCH MG: 15 INJECTION, SOLUTION INTRAMUSCULAR; INTRAVENOUS at 12:32

## 2022-10-28 RX ADMIN — CEFAZOLIN SCH MLS/HR: 330 INJECTION, POWDER, FOR SOLUTION INTRAMUSCULAR; INTRAVENOUS at 23:41

## 2022-10-28 RX ADMIN — METRONIDAZOLE SCH MLS/HR: 500 INJECTION, SOLUTION INTRAVENOUS at 19:39

## 2022-10-28 RX ADMIN — METRONIDAZOLE SCH MLS/HR: 500 INJECTION, SOLUTION INTRAVENOUS at 04:20

## 2022-10-28 RX ADMIN — METRONIDAZOLE SCH MLS/HR: 500 INJECTION, SOLUTION INTRAVENOUS at 12:26

## 2022-10-28 RX ADMIN — KETOROLAC TROMETHAMINE SCH MG: 15 INJECTION, SOLUTION INTRAMUSCULAR; INTRAVENOUS at 23:43

## 2022-10-28 NOTE — P.PN
Subjective


Progress Note Date: 10/28/22





CHIEF COMPLAINT: Ruptured appendicitis with possible cecal tumor





HISTORY OF PRESENT ILLNESS: Patient is status post diagnostic laparoscopy 

converted to laparotomy with ileocecectomy and open repair of umbilical hernia. 

Patient had computed tomography scan abdomen and pelvis showing a partially 

small bowel structure pattern.  NG tube was placed.  Patient had 760 mL bilious 

output through the NG tube through the night.  There is currently 300 mL bilious

output.  She does report improvement of her abdominal pain since NG tube was 

placed.  She continues to have diarrhea.  Denies any flatus.  She's complaining 

of some throat irritation from the NG tube.  Low-grade temp 99.7. no new labs.  

MARIA M drain with 10 mL serous output 





PHYSICAL EXAM: 


VITAL SIGNS: Reviewed.


GENERAL: Well-developed in no acute distress. 


HEENT:  No sclera icterus. Extraocular movements grossly intact.  Moist buccal 

mucosa. Head is atraumatic, normocephalic. 


ABDOMEN:  Soft.  Nondistended.  Tender at incision sites.  Incisional dressing 

clean dry and intact for midline incision.  MARIA M drain with serous output.  

Leaking around the MARIA M drain.


NEUROLOGIC: Alert and oriented. Cranial nerves II through XII grossly intact.





ASSESSMENT: 


1.  Ruptured appendicitis with cecal tumor status post diagnostic laparoscopy 

converted to laparotomy with ileocecectomy and open repair of umbilical hernia


2.  Adenocarcinoma noted on pathology


3.  Partial small bowel obstruction





PLAN: 


-Continue NG tube for decompression


-Keep patient nothing by mouth


-Medicine service has ordered Hurricaine spray for throat irritation


-Continue to monitor


-Continue pain medication as needed


-Continue antibiotics


-Encourage patient to ambulate


-Encourage patient to use incentive spirometer


-GI prophylaxis Protonix and DVT prophylaxis subcu heparin





Physician Assistant note has been reviewed by physician. Signing provider agrees

with the documented findings, assessment, and plan of care. 





I have personally seen and examined the patient, reviewed the NP /PAs history, 

exam and MDM and agree with the assessment and plan as written. Based on total 

visit time, I have performed more than 50% of the visit.





As above:  Patient feels better after nasogastric tube placement last night.  

Abdomen is less bloated she says.  No crampy abdominal pain at this time.  

Patient is complaining of a sore throat now.  She has had 2 bowel movements 

since the nasogastric tube was placed.  Still loose consistency.  Continue NG 

tube today.  If doing well and NG output is low we'll consider removal of 

nasogastric tube tomorrow with slow reinitiation of diet.  CAT scan films again 

discussed with patient and actually showed to her and her mother at the bedside.

 Continue antibiotics.  Keep drain in place for now.





Objective





- Vital Signs


Vital signs: 


                                   Vital Signs











Temp  98.5 F   10/28/22 04:27


 


Pulse  82   10/28/22 04:27


 


Resp  18   10/28/22 04:27


 


BP  109/74   10/28/22 04:27


 


Pulse Ox  96   10/28/22 04:27


 


FiO2      








                                 Intake & Output











 10/27/22 10/28/22 10/28/22





 18:59 06:59 18:59


 


Intake Total 400  


 


Output Total 90 760 


 


Balance 310 -760 


 


Weight 94.347 kg  


 


Intake:   


 


  Intake, IV Titration 400  





  Amount   


 


    Piperacillin-Tazobactam 3 200  





    .375 gm In Sodium   





    Chloride 0.9% 100 ml @ 25   





    mls/hr IVPB Q8HR [facility] Rx#   





    :[id number]   


 


    metroNIDAZOLE-NS  200  





    mg In Saline 1 100ml.bag   





    @ 100 mls/hr IVPB Q8H [facility]   





    Rx#:[id number]   


 


Output:   


 


  Drainage 90 760 


 


    Left Abdomen 90 10 


 


    Nasogastic Tube  750 


 


Other:   


 


  Voiding Method  Toilet 














- Labs


CBC & Chem 7: 


                                 10/27/22 05:58





                                 10/26/22 08:41

## 2022-10-28 NOTE — P.PN
Subjective


Progress Note Date: 10/28/22


- History of Present Illness 10/21/22





his is a 52-year-old female who I sent to the emergency room from my office with

complaints of right lower quadrant abdominal pain that started yesterday 

morning.  Patient reports that she woke up with right lower quadrant abdominal 

pain that he continued to increase throughout the day.  She reports that the 

patient did not radiate.  She has been nauseous.  She's had a fever as high as 

101.4 at home.  Patient had a computed tomography scan that showed acute 

appendicitis with rupture.  She had leukocytosis, mild tachycardia on admission.

 Past surgical history includes  and uterine ablation.  She denies any 

cardiac history.  She was seen by Dr. Noble in consultation and taken to 

surgery for appendectomy.











10/24/2022 reports diarrhea this morning.  Nausea resolved.  Maintained on IV 

fluids and antibiotics of Flagyl and Zosyn.  Incentive spirometer up to 2500.   

Maintaining O2 sats in the high 90s on room air. Afebrile, normal WBC.  

Hemoglobin 10.8, platelets 302.  Renal function stable.  Reports surgical 

tenderness.  Denies chest pain, palpitations or shortness of breath. 





10/25/2022 pain controlled.  Reports diarrhea, maintained on gentle IV fluid 

hydration.  Continues on Zosyn, Flagyl. T-max 100.2, WBC 3.5.  Significant MARIA M 

serosanguineous drainage with leaking around the MARIA M drain.  Renal function 

stable  Maintaining O2 sats in the high 90s on room air.  Pathology pending.








10/26/2022 continues on Flagyl and Zosyn, T-max 100.2, renal function stable.  

Decreased MARIA M drainage, MARIA M fluid creatinine 0.7.  Pain controlled, mild nausea.  

Denies chest pain, palpitations or shortness of breath.








10/27/2022 .  Pathology reporting invasive well to moderately differentiated 

adenocarcinoma, margins negative.  Subserosal abscess with Necrosis, fibrosis 

and acute serositis consistent with perforated viscus.  Benign appendix without 

histopathologic changes.  5 mesenteric lymph nodes negative for metastasis.  

Surgical tenderness, pain improved.  Minimal nausea.  Low fiber Diet intake 

fair-reports she does not like hospital food.  Denies chest pain, palpitations 

or shortness of breath.  Afebrile, normal WBC.








10/28/2022 maintained on IV fluid hydration, Zosyn and Flagyl .Yesterday 

developed right lower quadrant tenderness, CT abdomen/pelvis performed,reporting

partial small bowel obstruction, flattened IVC and NG tube placed. T-max 99.7, 

WBC 4.45.  Reported continued diarrhea this morning.  Negative for C. difficile 

colitis.  Abdominal pain improved.





Objective





- Vital Signs


Vital signs: 


                                   Vital Signs











Temp  99 F   10/28/22 11:46


 


Pulse  81   10/28/22 11:46


 


Resp  16   10/28/22 11:46


 


BP  113/75   10/28/22 11:46


 


Pulse Ox  94 L  10/28/22 11:46


 


FiO2      








                                 Intake & Output











 10/27/22 10/28/22 10/28/22





 18:59 06:59 18:59


 


Intake Total 400  


 


Output Total 90 760 


 


Balance 310 -760 


 


Weight 94.347 kg  


 


Intake:   


 


  Intake, IV Titration 400  





  Amount   


 


    Piperacillin-Tazobactam 3 200  





    .375 gm In Sodium   





    Chloride 0.9% 100 ml @ 25   





    mls/hr IVPB Q8HR [facility] Rx#   





    :[id number]   


 


    metroNIDAZOLE-NS  200  





    mg In Saline 1 100ml.bag   





    @ 100 mls/hr IVPB Q8H [facility]   





    Rx#:[id number]   


 


Output:   


 


  Drainage 90 760 


 


    Left Abdomen 90 10 


 


    Nasogastic Tube  750 


 


Other:   


 


  Voiding Method  Toilet Toilet














- Exam





GENERAL: Alert and oriented 3, sitting up in bed, no acute distress


HEENT: Sclerae anicteric. Conjunctivae are clear.  NG tube with bilious drainage


Neck is supple/no JVD. 


RESPIRATORY: Clear to auscultation. No wheezes, rales, or rhonchi.  


CARDIOVASCULAR: Regular rate and rhythm. S1 and S2 noted.  No systolic or 

diastolic murmur auscultated.  


GASTROINTESTINAL: Positive distention noted.  Abdomen soft and round.  

Hypoactive bowel sounds.  Status post surgery, abdominal binder present, MARIA M with

serous drainage.


INTEGUMENTARY: No cyanosis. No jaundice. No rashes noted. No cellulitis noted.


EXTREMITIES: 2+ peripheral pulses. No evidence of peripheral edema. No calf 

tenderness noted.


NEUROLOGIC:  Cranial nerves II-XII intact.











- Labs


CBC & Chem 7: 


                                 10/27/22 05:58





                                 10/26/22 08:41





Assessment and Plan


Assessment: 


(1) Acute appendicitis with rupture with possible cecal tumor, status post 

diagnostic laparoscopy converted to laparotomy with ileocecectomy and open 

repair of umbilical hernia.  Pathology reporting invasive well to moderately 

differentiated adenocarcinoma, margins negative, like mesenteric lymph nodes 

negative for metastasis.


Current Visit: Yes   Status: Acute   Code(s): K35.32 - AC APPENDICITIS W PERF, L

OC PERITONITIS, & GANGR, W/O ABSCS   SNOMED Code(s): 38608409


   





(2) Anxiety


Current Visit: No   Status: Acute   Code(s): F41.9 - ANXIETY DISORDER, 

UNSPECIFIED   SNOMED Code(s): 07527548





(3) Partial Small Bowel Obstruction


   


Plan: Continue on current medication regime ,monitoring and symptomatic 

treatment.  Cetacaine spray ordered for irritated throat secondary to NG tube 

.NPO, NG tube maintenance as per surgery.  Antibiotics, pain management, IV 

fluid hydration.  Increase ambulation as tolerated.  Continue with aggressive 

pulmonary toileting with incentive spirometer reinforced. 











The impression and plan of care has been dictated as directed.





:


I performed a history and examination of this patient,  discussed the same with 

the dictator.  I agree with the dictator's note ,documented as a scribe.  Any 

additional findings or plans will be noted.

## 2022-10-29 LAB
ANION GAP SERPL CALC-SCNC: 14 MMOL/L
BASOPHILS # BLD AUTO: 0 K/UL (ref 0–0.2)
BASOPHILS NFR BLD AUTO: 1 %
BUN SERPL-SCNC: 15 MG/DL (ref 7–17)
CALCIUM SPEC-MCNC: 7.8 MG/DL (ref 8.4–10.2)
CHLORIDE SERPL-SCNC: 106 MMOL/L (ref 98–107)
CO2 SERPL-SCNC: 19 MMOL/L (ref 22–30)
EOSINOPHIL # BLD AUTO: 0.1 K/UL (ref 0–0.7)
EOSINOPHIL NFR BLD AUTO: 2 %
ERYTHROCYTE [DISTWIDTH] IN BLOOD BY AUTOMATED COUNT: 4.13 M/UL (ref 3.8–5.4)
ERYTHROCYTE [DISTWIDTH] IN BLOOD: 13.8 % (ref 11.5–15.5)
GLUCOSE SERPL-MCNC: 66 MG/DL (ref 74–99)
HCT VFR BLD AUTO: 37.5 % (ref 34–46)
HGB BLD-MCNC: 11.8 GM/DL (ref 11.4–16)
LYMPHOCYTES # SPEC AUTO: 1.2 K/UL (ref 1–4.8)
LYMPHOCYTES NFR SPEC AUTO: 18 %
MCH RBC QN AUTO: 28.7 PG (ref 25–35)
MCHC RBC AUTO-ENTMCNC: 31.6 G/DL (ref 31–37)
MCV RBC AUTO: 90.8 FL (ref 80–100)
MONOCYTES # BLD AUTO: 0.5 K/UL (ref 0–1)
MONOCYTES NFR BLD AUTO: 7 %
NEUTROPHILS # BLD AUTO: 4.7 K/UL (ref 1.3–7.7)
NEUTROPHILS NFR BLD AUTO: 70 %
PLATELET # BLD AUTO: 386 K/UL (ref 150–450)
POTASSIUM SERPL-SCNC: 4.1 MMOL/L (ref 3.5–5.1)
SODIUM SERPL-SCNC: 139 MMOL/L (ref 137–145)
WBC # BLD AUTO: 6.6 K/UL (ref 3.8–10.6)

## 2022-10-29 RX ADMIN — CEFAZOLIN SCH MLS/HR: 330 INJECTION, POWDER, FOR SOLUTION INTRAMUSCULAR; INTRAVENOUS at 16:08

## 2022-10-29 RX ADMIN — METRONIDAZOLE SCH MLS/HR: 500 INJECTION, SOLUTION INTRAVENOUS at 13:42

## 2022-10-29 RX ADMIN — HYDROCODONE BITARTRATE AND ACETAMINOPHEN PRN EACH: 5; 325 TABLET ORAL at 19:39

## 2022-10-29 RX ADMIN — HEPARIN SODIUM SCH UNIT: 5000 INJECTION INTRAVENOUS; SUBCUTANEOUS at 09:23

## 2022-10-29 RX ADMIN — PIPERACILLIN AND TAZOBACTAM SCH MLS/HR: 3; .375 INJECTION, POWDER, FOR SOLUTION INTRAVENOUS at 15:33

## 2022-10-29 RX ADMIN — HEPARIN SODIUM SCH UNIT: 5000 INJECTION INTRAVENOUS; SUBCUTANEOUS at 23:48

## 2022-10-29 RX ADMIN — METRONIDAZOLE SCH MLS/HR: 500 INJECTION, SOLUTION INTRAVENOUS at 19:34

## 2022-10-29 RX ADMIN — PANTOPRAZOLE SODIUM SCH MG: 40 INJECTION, POWDER, FOR SOLUTION INTRAVENOUS at 09:23

## 2022-10-29 RX ADMIN — PIPERACILLIN AND TAZOBACTAM SCH MLS/HR: 3; .375 INJECTION, POWDER, FOR SOLUTION INTRAVENOUS at 23:47

## 2022-10-29 RX ADMIN — PIPERACILLIN AND TAZOBACTAM SCH MLS/HR: 3; .375 INJECTION, POWDER, FOR SOLUTION INTRAVENOUS at 09:24

## 2022-10-29 RX ADMIN — KETOROLAC TROMETHAMINE SCH MG: 15 INJECTION, SOLUTION INTRAMUSCULAR; INTRAVENOUS at 05:33

## 2022-10-29 RX ADMIN — METRONIDAZOLE SCH MLS/HR: 500 INJECTION, SOLUTION INTRAVENOUS at 03:34

## 2022-10-29 RX ADMIN — HEPARIN SODIUM SCH: 5000 INJECTION INTRAVENOUS; SUBCUTANEOUS at 15:36

## 2022-10-29 RX ADMIN — KETOROLAC TROMETHAMINE SCH MG: 15 INJECTION, SOLUTION INTRAMUSCULAR; INTRAVENOUS at 13:41

## 2022-10-29 NOTE — P.PN
Progress Note - Text


Progress Note Date: 10/29/22





Patient states she feels better.  She's requesting her nasogastric tube to be 

removed.





On exam vital signs are stable.  Abdomen soft.  Incision is clean.





Resolving ileus.  Patient will have her nasogastric tube removed.  She'll start 

on clear liquids

## 2022-10-29 NOTE — P.PN
Subjective


Progress Note Date: 10/29/22


This is a 52 year old female patient of Dr. Pond who is admitted for right 

lower quadrant abdominal pain and was found to have acute appendicitis with 

rupture. Patient is status post diagnostic laproscopy converted to laporotomy 

with ileocecectomy and open repair of umbilical hernia. Pathology has found i

nvasive well to moderately differentiated adenocarcinoma, margins negative, like

mesenteric lymph nodes negative for metastasis. Postoperatively patient had 

partial small bowel obstruction pattern with flattened IVC. NG tube was inserted

for management. Patient had NG tube removed today and has been started on clear 

liquid diet. MARIA M drain remains in place with sanguineous drainage present. 

Abdominal binder in place. Overall feeling better. Labs are showing improvement,

white count today 6.6., hgb 11.8. Afebrile, blood pressure 122/81





Review of Systems





Constitutional: Denied any fatigue denied any fever.


Cardio vascular: denied any chest pain, palpitations


Gastrointestinal: denied any nausea, vomiting, diarrhea


Pulmonary: Denied any shortness of breath cough


Neurologic denied any new focal deficits





All inpatient medications were reviewed and appropriate changes in these 

medications as dictated in the interval history and assessment and plan.





PHYSICAL EXAMINATION: 





GENERAL: The patient is alert and oriented x3, not in any acute distress. Well 

developed, well nourished. 


HEENT: Pupils are round and equally reacting to light. EOMI. No scleral icterus.

No conjunctival pallor. Normocephalic, atraumatic. No pharyngeal erythema. No 

thyromegaly. 


CARDIOVASCULAR: S1 and S2 present. No murmurs, rubs, or gallops. 


PULMONARY: Chest is clear to auscultation, no wheezing or crackles. 


ABDOMEN: Soft, tender, nondistended, normoactive bowel sounds. No palpable 

organomegaly. Abdominal binder in place. MARIA M drain in place.


MUSCULOSKELETAL: No joint swelling or deformity.


EXTREMITIES: No cyanosis, clubbing, or pedal edema. 


NEUROLOGICAL: Gross neurological examination did not reveal any focal deficits. 


SKIN: No rashes. 





Assessment and Plan 


Abdominal pain secondary to acute appendicitis with rupture 


Diagnostic laproscopy converted to laporotomy with ileocecectomy and open repair

of umbilical hernia with invasive well to moderately differentiated 

adenocarcinoma on pathology. Margins negative.


Postoperative bowel obstruction with conservative management NG tube has been 

removed today and patient is started on diet. 


Anxiety


History tobacco use 


GI Prophylaxis


DVT Prophylaxis as per primary 


Full Code 





The impression and plan of care has been dictated by Radha Carrion Nurse 

Practitioner as directed.





Dr. Sierra MD


I have performed a history and physical examination and medical decision making 

of this patient, discussed the same with the dictator, and agree with the 

dictators assessment and plan as written, documented as a scribe. Based on total

visit time, I have performed more than 50% of this visit. 








Objective





- Vital Signs


Vital signs: 


                                   Vital Signs











Temp  98.1 F   10/29/22 12:16


 


Pulse  80   10/29/22 12:16


 


Resp  18   10/29/22 12:16


 


BP  122/81   10/29/22 12:16


 


Pulse Ox  98   10/29/22 12:16


 


FiO2      








                                 Intake & Output











 10/28/22 10/29/22 10/29/22





 18:59 06:59 18:59


 


Intake Total 1000 1230 


 


Output Total 610 460 115


 


Balance 390 770 -115


 


Intake:   


 


  Intake, IV Titration 1000 1200 





  Amount   


 


    Piperacillin-Tazobactam 3 200 100 





    .375 gm In Sodium   





    Chloride 0.9% 100 ml @ 25   





    mls/hr IVPB Q8HR [facility] Rx#   





    :[id number]   


 


    Sodium Chloride 0.9% 1, 600 900 





    000 ml @ 75 mls/hr IV .   





    [id number] [facility] Rx#:[id number]   


 


    metroNIDAZOLE-NS  200 200 





    mg In Saline 1 100ml.bag   





    @ 100 mls/hr IVPB Q8H [facility]   





    Rx#:[id number]   


 


  Oral  30 


 


Output:   


 


  Gastric Drainage 600 450 


 


  Drainage 10 10 15


 


    Left Abdomen 10 10 15


 


  Urine   100


 


Other:   


 


  Voiding Method Toilet Toilet Toilet


 


  # Voids 3 1 1


 


  # Bowel Movements 2  














- Labs


CBC & Chem 7: 


                                 10/29/22 05:41





                                 10/29/22 05:41


Labs: 


                  Abnormal Lab Results - Last 24 Hours (Table)











  10/29/22 Range/Units





  05:41 


 


Carbon Dioxide  19 L  (22-30)  mmol/L


 


Glucose  66 L  (74-99)  mg/dL


 


Calcium  7.8 L  (8.4-10.2)  mg/dL














Assessment and Plan


Time with Patient: Less than 30

## 2022-10-30 RX ADMIN — METRONIDAZOLE SCH MLS/HR: 500 INJECTION, SOLUTION INTRAVENOUS at 21:06

## 2022-10-30 RX ADMIN — METRONIDAZOLE SCH MLS/HR: 500 INJECTION, SOLUTION INTRAVENOUS at 12:28

## 2022-10-30 RX ADMIN — PANTOPRAZOLE SODIUM SCH MG: 40 INJECTION, POWDER, FOR SOLUTION INTRAVENOUS at 09:04

## 2022-10-30 RX ADMIN — PIPERACILLIN AND TAZOBACTAM SCH MLS/HR: 3; .375 INJECTION, POWDER, FOR SOLUTION INTRAVENOUS at 09:03

## 2022-10-30 RX ADMIN — CEFAZOLIN SCH: 330 INJECTION, POWDER, FOR SOLUTION INTRAMUSCULAR; INTRAVENOUS at 04:41

## 2022-10-30 RX ADMIN — PIPERACILLIN AND TAZOBACTAM SCH MLS/HR: 3; .375 INJECTION, POWDER, FOR SOLUTION INTRAVENOUS at 16:11

## 2022-10-30 RX ADMIN — HEPARIN SODIUM SCH UNIT: 5000 INJECTION INTRAVENOUS; SUBCUTANEOUS at 09:03

## 2022-10-30 RX ADMIN — METRONIDAZOLE SCH MLS/HR: 500 INJECTION, SOLUTION INTRAVENOUS at 04:11

## 2022-10-30 RX ADMIN — HEPARIN SODIUM SCH: 5000 INJECTION INTRAVENOUS; SUBCUTANEOUS at 15:47

## 2022-10-30 NOTE — P.PN
Subjective


Progress Note Date: 10/30/22


This is a 52 year old female patient of Dr. Pond who is admitted for right 

lower quadrant abdominal pain and was found to have acute appendicitis with 

rupture. Patient is status post diagnostic laproscopy converted to laporotomy 

with ileocecectomy and open repair of umbilical hernia. Pathology has found i

nvasive well to moderately differentiated adenocarcinoma, margins negative, like

mesenteric lymph nodes negative for metastasis. Postoperatively patient had 

partial small bowel obstruction pattern with flattened IVC. NG tube was inserted

for management. Patient had NG tube removed today and has been started on clear 

liquid diet. MARIA M drain remains in place with sanguineous drainage present. 

Abdominal binder in place. Overall feeling better. Labs are showing improvement,

white count today 6.6., hgb 11.8. Afebrile, blood pressure 122/81





10/30/2022


Patient sitting up in chair today. She will be advanced to regular diet. No 

acute events overnight. Continues with loose stools. She is afebrile, heart rate

71, blood pressure 100/64, 99% room air. She continues on IV metronidazole and 

also continues on IV piperacillin. IV fluids can be stopped if patient is 

tolerating diet. Possible discharge tomorrow. Dr. Pond will resume care in the 

morning. 





Review of Systems





Constitutional: Denied any fatigue denied any fever.


Cardio vascular: denied any chest pain, palpitations


Gastrointestinal: denied any nausea, vomiting, diarrhea Post surgical abdomen.


Pulmonary: Denied any shortness of breath cough


Neurologic denied any new focal deficits





All inpatient medications were reviewed and appropriate changes in these 

medications as dictated in the interval history and assessment and plan.





PHYSICAL EXAMINATION: 





GENERAL: The patient is alert and oriented x3, not in any acute distress. Well 

developed, well nourished. 


HEENT: Pupils are round and equally reacting to light. EOMI. No scleral icterus.

No conjunctival pallor. Normocephalic, atraumatic. No pharyngeal erythema. No 

thyromegaly. 


CARDIOVASCULAR: S1 and S2 present. No murmurs, rubs, or gallops. 


PULMONARY: Chest is clear to auscultation, no wheezing or crackles. 


ABDOMEN: Soft, tender, nondistended, normoactive bowel sounds. No palpable 

organomegaly. Abdominal binder in place. MARIA M drain in place.


MUSCULOSKELETAL: No joint swelling or deformity.


EXTREMITIES: No cyanosis, clubbing, or pedal edema. 


NEUROLOGICAL: Gross neurological examination did not reveal any focal deficits. 


SKIN: No rashes. 





Assessment and Plan 


Abdominal pain secondary to acute appendicitis with rupture 


Diagnostic laproscopy converted to laporotomy with ileocecectomy and open repair

of umbilical hernia with invasive well to moderately differentiated 

adenocarcinoma on pathology. Margins negative.


Postoperative bowel obstruction with conservative management NG tube has been 

removed and patient advanced to regular diet. 


Anxiety


History tobacco use 


GI Prophylaxis


DVT Prophylaxis as per primary 


Full Code 





The impression and plan of care has been dictated by Radha Carrion, Nurse 

Practitioner as directed.





Dr. Sierra MD


I have performed a history and physical examination and medical decision making 

of this patient, discussed the same with the dictator, and agree with the 

dictators assessment and plan as written, documented as a scribe. Based on total

visit time, I have performed more than 50% of this visit. 








Objective





- Vital Signs


Vital signs: 


                                   Vital Signs











Temp  98.1 F   10/30/22 11:21


 


Pulse  71   10/30/22 11:21


 


Resp  18   10/30/22 11:21


 


BP  100/64   10/30/22 11:21


 


Pulse Ox  99   10/30/22 11:21


 


FiO2      








                                 Intake & Output











 10/29/22 10/30/22 10/30/22





 18:59 06:59 18:59


 


Intake Total 1200 2100 


 


Output Total 115 0 6


 


Balance 1085 2100 -6


 


Intake:   


 


  Intake, IV Titration  900 





  Amount   


 


    Piperacillin-Tazobactam 3  100 





    .375 gm In Sodium   





    Chloride 0.9% 100 ml @ 25   





    mls/hr IVPB Q8HR PABLO Rx#   





    :[id number]   


 


    Sodium Chloride 0.9% 1,  600 





    000 ml @ 75 mls/hr IV .   





    [id number] PABLO Rx#:[id number]   


 


    metroNIDAZOLE-NS   200 





    mg In Saline 1 100ml.bag   





    @ 100 mls/hr IVPB Q8H PABLO   





    Rx#:[id number]   


 


  Oral 1200 1200 


 


Output:   


 


  Drainage 15 0 6


 


    Left Abdomen 15 0 6


 


  Urine 100  


 


Other:   


 


  Voiding Method Toilet Toilet Toilet


 


  # Voids 1 1 














- Labs


CBC & Chem 7: 


                                 10/29/22 05:41





                                 10/29/22 05:41





Assessment and Plan


Time with Patient: Less than 30

## 2022-10-30 NOTE — P.PN
Progress Note - Text


Progress Note Date: 10/30/22





Patient feels better.  She is tolerating her liquid diet.  She is requesting 

Morty.





On exam vital signs are stable.  Abdomen soft.  Incisions clean dry tach.





Status post limited right colectomy for appendicitis/cecal tumor.  Patient will 

have her diet advanced to regular diet.  Anticipate discharge home tomorrow.

## 2022-10-31 VITALS
SYSTOLIC BLOOD PRESSURE: 105 MMHG | HEART RATE: 78 BPM | TEMPERATURE: 98.4 F | RESPIRATION RATE: 17 BRPM | DIASTOLIC BLOOD PRESSURE: 71 MMHG

## 2022-10-31 RX ADMIN — HEPARIN SODIUM SCH UNIT: 5000 INJECTION INTRAVENOUS; SUBCUTANEOUS at 08:34

## 2022-10-31 RX ADMIN — PIPERACILLIN AND TAZOBACTAM SCH MLS/HR: 3; .375 INJECTION, POWDER, FOR SOLUTION INTRAVENOUS at 08:34

## 2022-10-31 RX ADMIN — CEFAZOLIN SCH: 330 INJECTION, POWDER, FOR SOLUTION INTRAMUSCULAR; INTRAVENOUS at 06:02

## 2022-10-31 RX ADMIN — HEPARIN SODIUM SCH: 5000 INJECTION INTRAVENOUS; SUBCUTANEOUS at 00:24

## 2022-10-31 RX ADMIN — METRONIDAZOLE SCH MLS/HR: 500 INJECTION, SOLUTION INTRAVENOUS at 04:51

## 2022-10-31 RX ADMIN — PANTOPRAZOLE SODIUM SCH MG: 40 INJECTION, POWDER, FOR SOLUTION INTRAVENOUS at 08:34

## 2022-10-31 RX ADMIN — PIPERACILLIN AND TAZOBACTAM SCH MLS/HR: 3; .375 INJECTION, POWDER, FOR SOLUTION INTRAVENOUS at 00:24

## 2022-10-31 RX ADMIN — METRONIDAZOLE SCH MLS/HR: 500 INJECTION, SOLUTION INTRAVENOUS at 12:01

## 2022-10-31 NOTE — P.DS
Providers


Date of admission: 


10/20/22 12:36





Expected date of discharge: 10/31/22


Attending physician: 


Raymon Antonio





Consults: 





                                        





10/20/22 19:37


Consult Physician Routine 


   Consulting Provider: Homer Pond


   Consult Reason/Comments: Medical management


   Do you want consulting provider notified?: Yes











Primary care physician: 


Homer Pond





Hospital Course: 





Discharge diagnosis


1.  Ruptured appendicitis with cecal tumor status post diagnostic laparoscopy 

converted to laparotomy with ileocecectomy and open repair of umbilical hernia


2.  Adenocarcinoma noted on pathology


3.  Partial small bowel obstruction managed conservatively





Hospital course


This is a 52-year-old female who presented to the ER with complaints of right 

lower quadrant abdominal pain with fever.  Patient had a computed tomography 

scan that showed acute appendicitis with rupture.  Patient is status post 

diagnostic laparoscopy converted to laparotomy with ileocecectomy and open 

repair of umbilical hernia.  Patient's pathology results did show 

adenocarcinoma.  Patient did develop a partial small bowel obstruction.  She 

required NG tube placed.  She was managed conservatively.  She has tolerated 

advancement of diet.  Her pain is controlled.  She is tolerating diet.  She's 

afebrile.  She has been up and ambulating.  She is having bowel movements.  

Midline incision site clean dry and intact.  She does have some serous drainage 

leaking noted at the MARIA M drain site.  Decreased output through the MARIA M drain.  MARIA M 

drain will be discontinued prior to discharge.  Patient is stable for discharge.

 Please refer to chart for any further details.





Physician Assistant note has been reviewed by physician. Signing provider agrees

with the documented findings, assessment, and plan of care. 


Patient Condition at Discharge: Stable





Plan - Discharge Summary


New Discharge Prescriptions: 


New


   HYDROcodone/APAP 5-325MG [Norco 5-325] 1 tab PO Q6HR PRN 3 Days #12 tab


     PRN Reason: Pain





Continue


   clonazePAM [KlonoPIN] 1 mg PO BID PRN


     PRN Reason: Anxiety


Discharge Medication List





clonazePAM [KlonoPIN] 1 mg PO BID PRN 03/01/16 [History]


HYDROcodone/APAP 5-325MG [Norco 5-325] 1 tab PO Q6HR PRN 3 Days #12 tab 10/31/22

[Rx]








Follow up Appointment(s)/Referral(s): 


Raymon Antonio MD [Medical Doctor] - 1 Week


Marco A Huerta MD [STAFF PHYSICIAN] - 1 Week


Salty,Homer, DO [Primary Care Provider] - 2 Weeks


Activity/Diet/Wound Care/Special Instructions: 


Please send home with wound care supplies at D/C. Thank you





Continune IS Q1H wa X 10





No driving while taking Norco


No lifting over 10 pounds


You may shower. No soaking or tub baths for 2 weeks


Very light activity until you are reevaluated at your follow up appointment with

your surgeon








Discharge Disposition: HOME WITH HOME HEALTH SERVICES

## 2022-10-31 NOTE — P.PN
Subjective


Progress Note Date: 10/31/22


- History of Present Illness 10/21/22





his is a 52-year-old female who I sent to the emergency room from my office with

complaints of right lower quadrant abdominal pain that started yesterday 

morning.  Patient reports that she woke up with right lower quadrant abdominal 

pain that he continued to increase throughout the day.  She reports that the 

patient did not radiate.  She has been nauseous.  She's had a fever as high as 

101.4 at home.  Patient had a computed tomography scan that showed acute 

appendicitis with rupture.  She had leukocytosis, mild tachycardia on admission.

 Past surgical history includes  and uterine ablation.  She denies any 

cardiac history.  She was seen by Dr. Noble in consultation and taken to 

surgery for appendectomy.











10/24/2022 reports diarrhea this morning.  Nausea resolved.  Maintained on IV 

fluids and antibiotics of Flagyl and Zosyn.  Incentive spirometer up to 2500.   

Maintaining O2 sats in the high 90s on room air. Afebrile, normal WBC.  

Hemoglobin 10.8, platelets 302.  Renal function stable.  Reports surgical 

tenderness.  Denies chest pain, palpitations or shortness of breath. 





10/25/2022 pain controlled.  Reports diarrhea, maintained on gentle IV fluid 

hydration.  Continues on Zosyn, Flagyl. T-max 100.2, WBC 3.5.  Significant MARIA M 

serosanguineous drainage with leaking around the MARIA M drain.  Renal function 

stable  Maintaining O2 sats in the high 90s on room air.  Pathology pending.








10/26/2022 continues on Flagyl and Zosyn, T-max 100.2, renal function stable.  

Decreased MARIA M drainage, MARIA M fluid creatinine 0.7.  Pain controlled, mild nausea.  

Denies chest pain, palpitations or shortness of breath.








10/27/2022 .  Pathology reporting invasive well to moderately differentiated 

adenocarcinoma, margins negative.  Subserosal abscess with Necrosis, fibrosis 

and acute serositis consistent with perforated viscus.  Benign appendix without 

histopathologic changes.  5 mesenteric lymph nodes negative for metastasis.  

Surgical tenderness, pain improved.  Minimal nausea.  Low fiber Diet intake 

fair-reports she does not like hospital food.  Denies chest pain, palpitations 

or shortness of breath.  Afebrile, normal WBC.








10/28/2022 maintained on IV fluid hydration, Zosyn and Flagyl .Yesterday 

developed right lower quadrant tenderness, CT abdomen/pelvis performed,reporting

partial small bowel obstruction, flattened IVC and NG tube placed. T-max 99.7, 

WBC 4.45.  Reported continued diarrhea this morning.  Negative for C. difficile 

colitis.  Abdominal pain improved.








10/31/2022 NG tube discontinued over the weekend, diet advanced to a regular, 

tolerating well.  Denies nausea vomiting, positive loose stools. Pain co

ntrolled.  Denies chest pain, palpitations or shortness of breath.  Maintained 

on Zosyn and Flagyl .Afebrile, normal WBC.





Objective





- Vital Signs


Vital signs: 


                                   Vital Signs











Temp  98.3 F   10/31/22 05:00


 


Pulse  71   10/31/22 05:00


 


Resp  16   10/31/22 05:00


 


BP  116/77   10/31/22 05:00


 


Pulse Ox  98   10/31/22 05:00


 


FiO2      








                                 Intake & Output











 10/30/22 10/31/22 10/31/22





 18:59 06:59 18:59


 


Intake Total 240 590 


 


Output Total 9 40 


 


Balance 231 550 


 


Intake:   


 


  Oral 240 590 


 


Output:   


 


  Drainage 9 40 


 


    Left Abdomen 9 40 


 


Other:   


 


  Voiding Method Toilet Toilet 


 


  # Voids 3 2 














- Exam





GENERAL: Alert and oriented 3, sitting up in bed, no acute distress


HEENT: Sclerae anicteric. Conjunctivae are clear.  MMM.


Neck is supple/no JVD. 


RESPIRATORY: Clear to auscultation. No wheezes, rales, or rhonchi.  


CARDIOVASCULAR: Regular rate and rhythm. S1 and S2 noted.  No systolic or 

diastolic murmur auscultated.  


GASTROINTESTINAL:   Abdomen soft and round, nondistended.  Positive bowel 

sounds.  Status post surgery, abdominal binder present, MARIA M drain.


INTEGUMENTARY: No cyanosis. No jaundice. No rashes noted. No cellulitis noted.


EXTREMITIES: 2+ peripheral pulses. No evidence of peripheral edema. No calf 

tenderness noted.


NEUROLOGIC:  Cranial nerves II-XII intact.











- Labs


CBC & Chem 7: 


                                 10/29/22 05:41





                                 10/29/22 05:41





Assessment and Plan


Assessment: 


(1) Acute appendicitis with rupture with possible cecal tumor, status post 

diagnostic laparoscopy converted to laparotomy with ileocecectomy and open 

repair of umbilical hernia.  Pathology reporting invasive well to moderately 

differentiated adenocarcinoma, margins negative, like mesenteric lymph nodes 

negative for metastasis.


Current Visit: Yes   Status: Acute   Code(s): K35.32 - AC APPENDICITIS W PERF, 

LOC PERITONITIS, & GANGR, W/O ABSCS   SNOMED Code(s): 83309184


   





(2) Anxiety


Current Visit: No   Status: Acute   Code(s): F41.9 - ANXIETY DISORDER, 

UNSPECIFIED   SNOMED Code(s): 64294168





(3) Postoperative Partial Small Bowel Obstruction, conservative management, 

status post NG, improved.


   


Plan: Continue on current medication regime ,monitoring and symptomatic 

treatment.  Medically cleared for discharge.  Follow-up with PCP in 2 weeks 

after follow-up with surgery.Increase ambulation as tolerated.  Continue with 

aggressive pulmonary toileting with incentive spirometer reinforced. 











The impression and plan of care has been dictated as directed.





:


I performed a history and examination of this patient,  discussed the same with 

the dictator.  I agree with the dictator's note ,documented as a scribe.  Any 

additional findings or plans will be noted.

## 2022-11-03 NOTE — CDI
Documentation Clarification Form



Date: 11/03/2022 07:54:20 AM

From: Christa Chong CCS, CCDS

Phone: [phone]

MRN: [id number]

Admit Date: 10/20/2022 12:36:00 PM

Patient Name: Rafaela Guardado

Visit Number: [id number]

Discharge Date:  10/31/2022 03:47:00 PM





ATTENTION: The Clinical Documentation Specialists (CDI) and [facility] Coding Staff 
appreciate your assistance in clarifying documentation. Please respond to the 
clarification below the line at the bottom and electronically sign. The CDI & 
[facility] Coding staff will review the response and follow-up if needed. Please note: 
Queries are made part of the Legal Health Record. If you have any questions, 
please contact the author of this message via ITS.



Dr. Raymon Antonio:



Postoperative Bowel Obstruction is documented in the Medical Management Progress
Notes beginning on 10/29 through 10/31 status post a Diagnostic Laparoscopy 
converted to laparotomy with Ileocecectomy with Open Repair of Umbilical hernia 
on 10/20. 



Per the 10/28 Medical Management Progress note and the 10/31 Discharge Summary: 
Partial Small Bowel Obstruction, managed conservatively is documented. 



Partial Small Bowel Obstruction is documented in the Surgeon's Progress note on 
10/28. 



Additional clarification is requested regarding the relationship, if any, that 
exists between the diagnosis of Partial Small Bowell Obstruction and the 
procedure. 



Patients Admitting Diagnosis Per the 10/20 General Surgery H/P: Acute Ruptured 
Appendicitis. 

10/20 Procedure Pre-Op Diagnosis: Acute Appendicitis

10/20 Post-Op Diagnosis: Ruptured Appendicitis with possible Cecal Tumor

10/20 Procedure performed: Diagnostic Laparoscopy converted to Laparotomy with 
Ileocecectomy with Open Repair Umbilical Hernia.  

Final Pathology: Invasive well to moderately differentiated adenocarcinoma, 
margins negative. Subserosal abscess with fat necrosis, fibrosis and acute 
serositis consistent with perforated viscus. Benign appendix without 
histopathologic changes. Five mesenteric lymph nodes negative for metastasis.



History/Risk Factors per the 10/31 General Surgery H/P: Anxiety, Migraines.

History/Risk Factors per the 10/21 Medical Management Consult: Depression, 
Smoker.  



Clinical Indicators: Presented to the ED on 10/20 with right lower quadrant 
abdominal pain, slight pain in lower back. 

Admit with Acute Appendicitis with Rupture per the 10/20 CT Abdomen/Pelvis. 



10/27 Repeat CT Abdomen/Pelvis for abdominal tenderness: Partial SBO pattern. 



Treatment 10/20: Surgery as above. NGT, IV Toradol 15 mg x1, IV Zofran 4 mg x2, 
IV Na Chl 1,000 mls @ 999 mls/hr q1H x2, IV Zosyn 100 mls @ 200 mls/hr x1, IV 
Dilaudid 0.5 mg q3H/prn, IV Tylenol 1,000 mg 100 mls @400 mls/hr q6H/prn, 
Heparin 5,000 units sq, IV Na Chl w/Cefazolin 50 mls. 

Post op 10/20: IV Dilaudid 1 mg q3H/prn, IV Dilaudid 0.5 mg x1, IV Zofran 4 mg 
x1, IV Demerol 25 mg x1, IV Tylenol 100 mls @ 400 mls/hr q6H, IV Flagyl 100 mls 
@ 100 mls/hr q8H, IV Kcl 1,000 mls @ 100 mls/hr q10H, IV Droperidol 0.125 mg x1.


10/28 NPO, NGT placed and continued until 10/29.  



What relationship, if any, exists between the diagnosis of Partial Small Bowel 
Obstruction and the procedure:



[  ]  Partial Small Bowel Obstruction is a complication of surgical procedure

[  ]  Partial Small Bowel Obstruction is an expected outcome of the surgical 
procedure

[  ]  Partial Small Bowel Obstruction is related to patients co-morbid 
condition(s), please specify: _________________ and is not a complication of the
procedure

[  ]  Other  please specify: _______________________

[x  ]  Unable to determine clinically doubt bowel obstruction more likely 
postoperative ileus which is an expected diagnosis given the patient's 
presentation



(Template Last Revised: March 2021)

___________________________________________________________________________ 

MTDD

## 2022-11-25 ENCOUNTER — HOSPITAL ENCOUNTER (OUTPATIENT)
Dept: HOSPITAL 47 - CATHCVL | Age: 52
Discharge: HOME | End: 2022-11-25
Attending: RADIOLOGY
Payer: COMMERCIAL

## 2022-11-25 VITALS — BODY MASS INDEX: 31.8 KG/M2

## 2022-11-25 VITALS — RESPIRATION RATE: 18 BRPM | TEMPERATURE: 98.6 F

## 2022-11-25 VITALS — DIASTOLIC BLOOD PRESSURE: 81 MMHG | HEART RATE: 81 BPM | SYSTOLIC BLOOD PRESSURE: 128 MMHG

## 2022-11-25 DIAGNOSIS — F17.200: ICD-10-CM

## 2022-11-25 DIAGNOSIS — Z71.3: ICD-10-CM

## 2022-11-25 DIAGNOSIS — Z79.01: ICD-10-CM

## 2022-11-25 DIAGNOSIS — Z80.42: ICD-10-CM

## 2022-11-25 DIAGNOSIS — Z90.49: ICD-10-CM

## 2022-11-25 DIAGNOSIS — C18.2: Primary | ICD-10-CM

## 2022-11-25 PROCEDURE — 36573 INSJ PICC RS&I 5 YR+: CPT

## 2022-11-25 NOTE — IR
PICC LINE PLACEMENT:

 

HISTORY:  Infection requiring long-term antibiotic therapy

 

PROCEDURE:  Ultrasound and fluoroscopic guidance of PICC line placement.

 

COMPLICATIONS:  None

 

ANESTHESIA:  1. 1% Lidocaine locally.

 

FINDINGS/TECHNIQUE:  The procedure was explained to the patient.  The risks, complications, benefits 
and alternatives were discussed and any questions were answered.  Informed consent was obtained.  The
 patient was placed supine on the fluoroscopic table and prepped and draped in the usual sterile fash
ion.  Utilizing a  21 gauge needle and sonographic and fluoroscopic guidance, access in the left basi
lic vein was achieved and there is placement of a 0.018 guidewire.  The vein is patent.  A 4-F sheath
 was placed over the guidewire.  The guidewire and dilator were removed and a 4-F. PICC line was plac
ed through the sheath with the tip at the level of the SVC.  The sheath was removed, the catheter was
 flushed and sutured into position.  The patient was stable throughout the procedure and remained sta
ble upon discharge from the Department of Radiology. 

 

The vein puncture was patent under ultrasound. A gray scale image was obtained to document patency of
 the vein punctured.

 

All elements of the maximal barrier technique were utilized.

 

FLUOROSCOPY TIME:  0.1 minutes and 1 images submitted

 

IMPRESSION: 

Successful PICC line placement under ultrasound and fluoroscopic guidance.

## 2023-03-21 ENCOUNTER — HOSPITAL ENCOUNTER (OUTPATIENT)
Dept: HOSPITAL 47 - RADMAMWWP | Age: 53
Discharge: HOME | End: 2023-03-21
Attending: FAMILY MEDICINE
Payer: COMMERCIAL

## 2023-03-21 DIAGNOSIS — Z98.890: ICD-10-CM

## 2023-03-21 DIAGNOSIS — Z12.31: Primary | ICD-10-CM

## 2023-03-21 PROCEDURE — 77063 BREAST TOMOSYNTHESIS BI: CPT

## 2023-03-21 PROCEDURE — 77067 SCR MAMMO BI INCL CAD: CPT

## 2023-03-22 NOTE — MM
Reason for Exam: Screening  (asymptomatic). 

Last screening mammogram was performed 12 month(s) ago.





Patient History: 

Menarche at age 12. First Full-Term Pregnancy at age 21. Previous chemotherapy at age 52. Hormonal

Contraceptives for 18 years from age 17 until age 39. 





Risk Values: 

Brook 5 year model risk: 0.9%.

NCI Lifetime model risk: 7.8%.





Prior Study Comparison: 

8/17/2015  Screening Mammogram, [facility]. 11/8/2016  Screening Mammogram, [facility]. 3/14/2022 Bilateral Screening Mammogram, [facility]. 





Tissue Density: 

There are scattered fibroglandular densities.





Findings: 

Analyzed By CAD. 

There is no suspicious group of microcalcifications or new suspicious mass in either breast. 





Overall Assessment: Benign, BI-RAD 2





Management: 

Screening Mammogram of both breasts in 1 year.

A clinical breast exam by your physician is recommended on an annual basis and results should be

correlated with mammographic findings.



Electronically signed and approved by: Leopold M. Fregoli, M.D. Radiologis

## 2023-10-23 ENCOUNTER — HOSPITAL ENCOUNTER (OUTPATIENT)
Dept: HOSPITAL 47 - RADCTMAIN | Age: 53
Discharge: HOME | End: 2023-10-23
Attending: INTERNAL MEDICINE
Payer: COMMERCIAL

## 2023-10-23 DIAGNOSIS — K76.89: ICD-10-CM

## 2023-10-23 DIAGNOSIS — K57.30: ICD-10-CM

## 2023-10-23 DIAGNOSIS — Z71.3: ICD-10-CM

## 2023-10-23 DIAGNOSIS — C18.2: Primary | ICD-10-CM

## 2023-10-23 PROCEDURE — 71260 CT THORAX DX C+: CPT

## 2023-10-23 PROCEDURE — 74177 CT ABD & PELVIS W/CONTRAST: CPT

## 2023-10-25 NOTE — CT
EXAMINATION TYPE: CT ChestAbdPelvis w con

 

DATE OF EXAM: 10/23/2023

 

INDICATION: Hx colon ca

 

COMPARISON: 10/7/2022

 

CT DLP: 1742.50 mGycm

 

CONTRAST: 

Performed with Oral Contrast and with IV Contrast, patient injected with 100 mL of Isovue 300.

 

TECHNIQUE: Axial images at 5 mm thick sections.  Reconstructed images in the coronal plane.  Delayed 
images through the kidneys.  

 

FINDINGS:

 

CT CHEST:

 

Portion of the thyroid visualized is normal.

 

No suspicious lung nodules or focal infiltrates are present.

 

No enlarged mediastinal or hilar adenopathy is evident. 

 

The ascending aorta diameter at the level of the main pulmonary artery is 3.2 cm.  The main pulmonary
 artery diameter at the bifurcation is 3.2 cm.

 

CT ABDOMEN:

 

Liver: Hepatic cysts are evident. No discrete masses are evident.

 

Spleen: Normal

 

Pancreas: Normal

 

Adrenal glands: The adrenal glands are normal.

 

Gallbladder: Normal  

 

Kidneys: No masses are evident. No hydronephrosis is present.   No cysts are present.  Delayed images
 were obtained through the kidneys, which remain unremarkable.

 

Aorta: Vascular calcification is within the aorta. 

 

Inferior vena cava: Normal.

 

CT PELVIS: 

Loops of bowel within the abdomen and pelvis are normal.  Diverticular changes are within the sigmoid
 colon. Portions of the colon containing contrast appears unremarkable.   There are loops of bowel wh
ich are incompletely distended or lack oral contrast limiting their evaluation.

 

Appendix: Not identified. No dilated tubular structure or inflammatory change is evident.

 

Urinary bladder: Decompressed with limited evaluation. 

 

Genitourinary structures: Uterus appears normal. Adnexa are unremarkable.

 

Osseous structures: No suspicious lytic or sclerotic lesions.

 

IMPRESSION:

1. No suspicious changes to suggest recurrent or metastatic colon cancer.

2. Diverticulosis without acute diverticulitis.

3. Hepatic cysts